# Patient Record
Sex: FEMALE | Race: OTHER | HISPANIC OR LATINO | URBAN - METROPOLITAN AREA
[De-identification: names, ages, dates, MRNs, and addresses within clinical notes are randomized per-mention and may not be internally consistent; named-entity substitution may affect disease eponyms.]

---

## 2019-01-30 ENCOUNTER — OUTPATIENT (OUTPATIENT)
Dept: OUTPATIENT SERVICES | Facility: HOSPITAL | Age: 69
LOS: 1 days | End: 2019-01-30
Payer: COMMERCIAL

## 2019-01-30 ENCOUNTER — APPOINTMENT (OUTPATIENT)
Dept: ULTRASOUND IMAGING | Facility: CLINIC | Age: 69
End: 2019-01-30
Payer: COMMERCIAL

## 2019-01-30 DIAGNOSIS — Z00.8 ENCOUNTER FOR OTHER GENERAL EXAMINATION: ICD-10-CM

## 2019-01-30 PROCEDURE — 76700 US EXAM ABDOM COMPLETE: CPT

## 2019-01-30 PROCEDURE — 76700 US EXAM ABDOM COMPLETE: CPT | Mod: 26

## 2021-02-24 ENCOUNTER — APPOINTMENT (OUTPATIENT)
Dept: PULMONOLOGY | Facility: CLINIC | Age: 71
End: 2021-02-24
Payer: COMMERCIAL

## 2021-02-24 VITALS — OXYGEN SATURATION: 98 % | SYSTOLIC BLOOD PRESSURE: 140 MMHG | HEART RATE: 78 BPM | DIASTOLIC BLOOD PRESSURE: 80 MMHG

## 2021-02-24 VITALS — HEIGHT: 65 IN | WEIGHT: 148 LBS | BODY MASS INDEX: 24.66 KG/M2

## 2021-02-24 VITALS — TEMPERATURE: 97.1 F

## 2021-02-24 VITALS — RESPIRATION RATE: 13 BRPM

## 2021-02-24 PROCEDURE — 99204 OFFICE O/P NEW MOD 45 MIN: CPT

## 2021-02-24 PROCEDURE — 99072 ADDL SUPL MATRL&STAF TM PHE: CPT

## 2021-02-24 RX ORDER — CETIRIZINE HYDROCHLORIDE 10 MG/1
CAPSULE, LIQUID FILLED ORAL
Refills: 0 | Status: ACTIVE | COMMUNITY

## 2021-02-24 NOTE — PHYSICAL EXAM
[No Acute Distress] : no acute distress [Normal Oropharynx] : normal oropharynx [Low Lying Soft Palate] : low lying soft palate [Enlarged Base of the Tongue] : enlarged base of the tongue [III] : Mallampati Class: III [Normal Appearance] : normal appearance [No Neck Mass] : no neck mass [Normal Rate/Rhythm] : normal rate/rhythm [Normal S1, S2] : normal s1, s2 [No Murmurs] : no murmurs [No Resp Distress] : no resp distress [Clear to Auscultation Bilaterally] : clear to auscultation bilaterally [No Abnormalities] : no abnormalities [Benign] : benign [Normal Gait] : normal gait [No Clubbing] : no clubbing [No Cyanosis] : no cyanosis [No Edema] : no edema [FROM] : FROM [Normal Color/ Pigmentation] : normal color/ pigmentation [No Focal Deficits] : no focal deficits [Oriented x3] : oriented x3 [Normal Affect] : normal affect

## 2021-02-24 NOTE — CONSULT LETTER
[Dear  ___] : Dear  [unfilled], [Consult Letter:] : I had the pleasure of evaluating your patient, [unfilled]. [Please see my note below.] : Please see my note below. [Consult Closing:] : Thank you very much for allowing me to participate in the care of this patient.  If you have any questions, please do not hesitate to contact me. [Sincerely,] : Sincerely, [FreeTextEntry3] : Michelle Cummings MD FCCP\par D-ABSM\par ABIM board certified in  Pulmonary diseases, Sleep medicine\par Internal medicine\par

## 2021-02-24 NOTE — HISTORY OF PRESENT ILLNESS
[TextBox_4] : The patient was diagnosed with obstructive sleep apnea about 8 years ago. She said CPAP since then. She tried an oral appliance which didn't work. Her current machine is over 5 years old. She's been complaining of sore throats. She is known to snore at times through the mask. Her use of CPAP has been intermittent because of the dryness and the sore throats. Her weight is similar to what was seen in 2013. She denies shortness of breath cough or wheeze. [Obstructive Sleep Apnea] : obstructive sleep apnea [CPAP:] : CPAP [TextBox_100] : 5/13 [TextBox_108] : 39 [TextBox_112] : 92 [TextBox_116] : 74 [TextBox_104] : 6/13 [TextBox_131] : 10

## 2021-02-24 NOTE — REASON FOR VISIT
[Consultation] : a consultation [Sleep Apnea] : sleep apnea [[employer] ] : provided by [employer]   [FreeTextEntry1] : [id number] [FreeTextEntry2] : Anne [TWNoteComboBox1] : [place]

## 2021-02-24 NOTE — ASSESSMENT
[FreeTextEntry1] : Patient with a history of severe obstructive sleep apnea. His current CPAP intolerance maybe due to poor humidification or inappropriate pressures. Repeat sleep study has been ordered. I will see her back after that. We will order AutoPap with heated hose at that point. If this does not solve the problem, she would be a good candidate for Inspire therapy.

## 2021-03-14 DIAGNOSIS — Z01.818 ENCOUNTER FOR OTHER PREPROCEDURAL EXAMINATION: ICD-10-CM

## 2021-03-15 ENCOUNTER — APPOINTMENT (OUTPATIENT)
Dept: DISASTER EMERGENCY | Facility: CLINIC | Age: 71
End: 2021-03-15

## 2021-03-16 LAB — SARS-COV-2 N GENE NPH QL NAA+PROBE: NOT DETECTED

## 2021-03-17 ENCOUNTER — OUTPATIENT (OUTPATIENT)
Dept: OUTPATIENT SERVICES | Facility: HOSPITAL | Age: 71
LOS: 1 days | End: 2021-03-17
Payer: COMMERCIAL

## 2021-03-17 DIAGNOSIS — G47.33 OBSTRUCTIVE SLEEP APNEA (ADULT) (PEDIATRIC): ICD-10-CM

## 2021-03-17 PROCEDURE — 95810 POLYSOM 6/> YRS 4/> PARAM: CPT | Mod: 26

## 2021-03-17 PROCEDURE — 95810 POLYSOM 6/> YRS 4/> PARAM: CPT

## 2021-03-29 ENCOUNTER — APPOINTMENT (OUTPATIENT)
Dept: PULMONOLOGY | Facility: CLINIC | Age: 71
End: 2021-03-29
Payer: COMMERCIAL

## 2021-03-29 VITALS
BODY MASS INDEX: 26.58 KG/M2 | WEIGHT: 150 LBS | DIASTOLIC BLOOD PRESSURE: 68 MMHG | SYSTOLIC BLOOD PRESSURE: 124 MMHG | HEIGHT: 63 IN | TEMPERATURE: 97.8 F | HEART RATE: 67 BPM | RESPIRATION RATE: 14 BRPM | OXYGEN SATURATION: 98 %

## 2021-03-29 PROCEDURE — 99072 ADDL SUPL MATRL&STAF TM PHE: CPT

## 2021-03-29 PROCEDURE — 99214 OFFICE O/P EST MOD 30 MIN: CPT

## 2021-03-29 RX ORDER — ERGOCALCIFEROL 1.25 MG/1
1.25 MG CAPSULE, LIQUID FILLED ORAL
Qty: 8 | Refills: 0 | Status: ACTIVE | COMMUNITY
Start: 2021-02-23

## 2021-03-29 RX ORDER — CEPHALEXIN 250 MG/1
250 CAPSULE ORAL
Qty: 40 | Refills: 0 | Status: DISCONTINUED | COMMUNITY
Start: 2020-11-30 | End: 2021-03-29

## 2021-03-29 RX ORDER — BLOOD SUGAR DIAGNOSTIC
STRIP MISCELLANEOUS
Qty: 250 | Refills: 0 | Status: ACTIVE | COMMUNITY
Start: 2020-10-24

## 2021-03-29 RX ORDER — ERYTHROMYCIN 5 MG/G
5 OINTMENT OPHTHALMIC
Qty: 4 | Refills: 0 | Status: DISCONTINUED | COMMUNITY
Start: 2020-10-12 | End: 2021-03-29

## 2021-03-29 RX ORDER — ATORVASTATIN CALCIUM 40 MG/1
40 TABLET, FILM COATED ORAL
Qty: 90 | Refills: 0 | Status: ACTIVE | COMMUNITY
Start: 2020-10-24

## 2021-03-29 RX ORDER — CIPROFLOXACIN HYDROCHLORIDE 500 MG/1
500 TABLET, FILM COATED ORAL
Qty: 1 | Refills: 0 | Status: DISCONTINUED | COMMUNITY
Start: 2020-12-31 | End: 2021-03-29

## 2021-03-29 RX ORDER — ICOSAPENT ETHYL 1000 MG/1
1 CAPSULE ORAL
Qty: 360 | Refills: 0 | Status: ACTIVE | COMMUNITY
Start: 2021-03-03

## 2021-03-29 RX ORDER — VALACYCLOVIR 500 MG/1
500 TABLET, FILM COATED ORAL
Qty: 60 | Refills: 0 | Status: ACTIVE | COMMUNITY
Start: 2021-01-30

## 2021-03-29 RX ORDER — TAMSULOSIN HYDROCHLORIDE 0.4 MG/1
0.4 CAPSULE ORAL
Qty: 30 | Refills: 0 | Status: ACTIVE | COMMUNITY
Start: 2021-01-14

## 2021-03-29 RX ORDER — AMITRIPTYLINE HYDROCHLORIDE 10 MG/1
10 TABLET, FILM COATED ORAL
Qty: 30 | Refills: 0 | Status: ACTIVE | COMMUNITY
Start: 2021-02-23

## 2021-03-29 RX ORDER — METFORMIN ER 500 MG 500 MG/1
500 TABLET ORAL
Qty: 270 | Refills: 0 | Status: ACTIVE | COMMUNITY
Start: 2020-10-24

## 2021-03-29 RX ORDER — ZOLPIDEM TARTRATE 10 MG/1
10 TABLET ORAL
Qty: 30 | Refills: 0 | Status: ACTIVE | COMMUNITY
Start: 2021-03-09

## 2021-03-29 RX ORDER — METOPROLOL SUCCINATE 50 MG/1
50 TABLET, EXTENDED RELEASE ORAL
Qty: 180 | Refills: 0 | Status: ACTIVE | COMMUNITY
Start: 2021-03-03

## 2021-03-29 RX ORDER — LANCETS 30 GAUGE
EACH MISCELLANEOUS
Qty: 200 | Refills: 0 | Status: ACTIVE | COMMUNITY
Start: 2020-05-19

## 2021-03-29 NOTE — HISTORY OF PRESENT ILLNESS
[Obstructive Sleep Apnea] : obstructive sleep apnea [Lab] : lab [TextBox_100] : 3/21 [TextBox_108] : 30 [TextBox_112] : 86 [TextBox_116] : 78 [TextBox_120] : AHI 51 in REM [TextBox_165] : I reviewed the patient's sleep study with the patient.\par The patient does not wish to have CPAP as she has not tolerated in the past. She wishes Inspire therapy.

## 2021-03-29 NOTE — ASSESSMENT
[FreeTextEntry1] : Patient with moderate to severe sleep apnea with CPAP intolerance. Her BMI is appropriate for Inspire therapy. Literature was given to the patient and referral was made. I will see her back here for programming after the implant is done.

## 2021-04-16 ENCOUNTER — APPOINTMENT (OUTPATIENT)
Dept: OTOLARYNGOLOGY | Facility: CLINIC | Age: 71
End: 2021-04-16
Payer: COMMERCIAL

## 2021-04-16 VITALS
HEIGHT: 63.5 IN | BODY MASS INDEX: 26.77 KG/M2 | SYSTOLIC BLOOD PRESSURE: 148 MMHG | WEIGHT: 153 LBS | HEART RATE: 75 BPM | DIASTOLIC BLOOD PRESSURE: 66 MMHG

## 2021-04-16 VITALS — BODY MASS INDEX: 26.77 KG/M2 | WEIGHT: 153 LBS | HEIGHT: 63.5 IN

## 2021-04-16 DIAGNOSIS — R73.03 PREDIABETES.: ICD-10-CM

## 2021-04-16 DIAGNOSIS — Z78.9 OTHER SPECIFIED HEALTH STATUS: ICD-10-CM

## 2021-04-16 PROCEDURE — 31575 DIAGNOSTIC LARYNGOSCOPY: CPT

## 2021-04-16 PROCEDURE — 99204 OFFICE O/P NEW MOD 45 MIN: CPT | Mod: 25

## 2021-04-16 PROCEDURE — 99072 ADDL SUPL MATRL&STAF TM PHE: CPT

## 2021-04-16 RX ORDER — NITROFURANTOIN (MONOHYDRATE/MACROCRYSTALS) 25; 75 MG/1; MG/1
100 CAPSULE ORAL
Qty: 30 | Refills: 0 | Status: DISCONTINUED | COMMUNITY
Start: 2021-01-14 | End: 2021-04-16

## 2021-04-16 NOTE — PROCEDURE
[Video Captured] : video captured and filed [Image(s) Captured] : image(s) captured and filed [Obstruction] : acute airway obstruction evaluation [Complicated Symptoms] : complicated symptoms requiring more thorough examination than provided by mirror [Topical Lidocaine] : topical lidocaine [Oxymetazoline HCl] : oxymetazoline HCl [Flexible Endoscope] : examined with the flexible endoscope [Serial Number: ___] : Serial Number: [unfilled] [Velopharynx ___ %] : the velopharynx was [unfilled]U% collapsed [Normal] : the false vocal folds were pink and regular, the ventricular sulcus was open, the true vocal folds were glistening white, tense and of equal length, mobility, and height [True Vocal Cords Paralysis] : no true vocal cord paralysis [True Vocal Cords Erythematous] : no true vocal cord edema [True Vocal Cords Parker's Nodules] : no true vocal cord nodules [Glottis Arytenoid Cartilages] : no arytenoid granulomas [Glottis Arytenoid Cartilages Erythema] : no arytenoid erythema [de-identified] : Patient was placed in the examination chair in a sitting position. The nose was decongested with oxymetazoline nasal solution. The airway was anesthetized with 4% Xylocaine.  The back of the throat was anesthetized with Cetacaine. Direct flexible/rigid video endoscopy was performed. Findings revealed:\par Patient is a deviated nasal septum to the left turbinate hypertrophy. Positive Gupta maneuver on inspiration with complete closure. Thick base of tongue larynx epiglottic vocal cords normal [FreeTextEntry3] : positive Gupta maneuver [de-identified] : thick

## 2021-04-16 NOTE — REASON FOR VISIT
[Initial Consultation] : an initial consultation for [FreeTextEntry2] : referred by Dr. Michelle Cummings, pulmonologist, for obstructive sleep apnea

## 2021-04-16 NOTE — CONSULT LETTER
[Dear  ___] : Dear  [unfilled], [Consult Letter:] : I had the pleasure of evaluating your patient, [unfilled]. [Please see my note below.] : Please see my note below. [Consult Closing:] : Thank you very much for allowing me to participate in the care of this patient.  If you have any questions, please do not hesitate to contact me. [Sincerely,] : Sincerely, [FreeTextEntry3] : Iggy Borges MD, ALCON, FACS\par  Department Otolaryngology\par Director of [facility]\par Professor of Otolaryngology, \par [employer]\par

## 2021-04-16 NOTE — HISTORY OF PRESENT ILLNESS
[Obstructive Sleep Apnea] : Obstructive Sleep Apnea [Snoring] : snoring [Witnessed Apneas] : witnessed sleep apnea [Frequent Nocturnal Awakening] : frequent nocturnal awakening [Daytime Somnolence] : daytime somnolence [Unintentional Sleep while Active] : unintentional sleep while active [Unintentional Sleep While Inactive] : unintentional sleep while inactive [Awakes Unrefreshed] : awakening unrefreshed [Awakening With Dry Mouth] : awakening with dry mouth [AHI: ___ per hour] : the apnea-hypopnea index was [unfilled] per hour [de-identified] : 70 year old female referred by Dr. Michelle Cummings, pulmonologist, for obstructive sleep apnea.  Patient had a sleep study conducted on 3/17/21 showing an AHI of 29.9 with central and mixed apneas less than 25% of the total apneas.\par Patient has a BMI of 26.7.  Patient has tried a CPAP machine without relief.  Patient complains of removing mask during sleep.  Patient has tried various types of masks without relief.  Patient states mask causes skin irritation, difficulty breathing, dry nose, dry mouth.  Patient sleeping partner complains about the noise of the CPAP machine, stating it keeps them awake at night.  Patient comorbidities include pre-diabetic, daytime fatigue.\par  [Awakes with Headache] : no headache upon awakening
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
## 2021-05-02 ENCOUNTER — EMERGENCY (EMERGENCY)
Facility: HOSPITAL | Age: 71
LOS: 1 days | Discharge: HOME | End: 2021-05-02
Attending: EMERGENCY MEDICINE
Payer: COMMERCIAL

## 2021-05-02 VITALS
OXYGEN SATURATION: 99 % | TEMPERATURE: 98 F | SYSTOLIC BLOOD PRESSURE: 133 MMHG | DIASTOLIC BLOOD PRESSURE: 63 MMHG | HEART RATE: 71 BPM | RESPIRATION RATE: 20 BRPM

## 2021-05-02 LAB
ALBUMIN SERPL ELPH-MCNC: 4.1 G/DL — SIGNIFICANT CHANGE UP (ref 3.3–5.2)
ALP SERPL-CCNC: 63 U/L — SIGNIFICANT CHANGE UP (ref 40–120)
ALT FLD-CCNC: 28 U/L — SIGNIFICANT CHANGE UP
ANION GAP SERPL CALC-SCNC: 17 MMOL/L — SIGNIFICANT CHANGE UP (ref 5–17)
APPEARANCE UR: CLEAR — SIGNIFICANT CHANGE UP
APTT BLD: 26 SEC — LOW (ref 27.5–35.5)
AST SERPL-CCNC: 26 U/L — SIGNIFICANT CHANGE UP
BASOPHILS # BLD AUTO: 0.05 K/UL — SIGNIFICANT CHANGE UP (ref 0–0.2)
BASOPHILS NFR BLD AUTO: 0.8 % — SIGNIFICANT CHANGE UP (ref 0–2)
BILIRUB SERPL-MCNC: 0.3 MG/DL — LOW (ref 0.4–2)
BILIRUB UR-MCNC: NEGATIVE — SIGNIFICANT CHANGE UP
BUN SERPL-MCNC: 16 MG/DL — SIGNIFICANT CHANGE UP (ref 8–20)
CALCIUM SERPL-MCNC: 9.5 MG/DL — SIGNIFICANT CHANGE UP (ref 8.6–10.2)
CHLORIDE SERPL-SCNC: 101 MMOL/L — SIGNIFICANT CHANGE UP (ref 98–107)
CK SERPL-CCNC: 62 U/L — SIGNIFICANT CHANGE UP (ref 25–170)
CO2 SERPL-SCNC: 19 MMOL/L — LOW (ref 22–29)
COLOR SPEC: YELLOW — SIGNIFICANT CHANGE UP
CREAT SERPL-MCNC: 1.03 MG/DL — SIGNIFICANT CHANGE UP (ref 0.5–1.3)
DIFF PNL FLD: NEGATIVE — SIGNIFICANT CHANGE UP
EOSINOPHIL # BLD AUTO: 0.07 K/UL — SIGNIFICANT CHANGE UP (ref 0–0.5)
EOSINOPHIL NFR BLD AUTO: 1.1 % — SIGNIFICANT CHANGE UP (ref 0–6)
EPI CELLS # UR: SIGNIFICANT CHANGE UP
GLUCOSE SERPL-MCNC: 188 MG/DL — HIGH (ref 70–99)
GLUCOSE UR QL: 1000 MG/DL
HCT VFR BLD CALC: 39.8 % — SIGNIFICANT CHANGE UP (ref 34.5–45)
HGB BLD-MCNC: 13.6 G/DL — SIGNIFICANT CHANGE UP (ref 11.5–15.5)
IMM GRANULOCYTES NFR BLD AUTO: 1.1 % — SIGNIFICANT CHANGE UP (ref 0–1.5)
INR BLD: 1.03 RATIO — SIGNIFICANT CHANGE UP (ref 0.88–1.16)
KETONES UR-MCNC: ABNORMAL
LEUKOCYTE ESTERASE UR-ACNC: NEGATIVE — SIGNIFICANT CHANGE UP
LYMPHOCYTES # BLD AUTO: 1.54 K/UL — SIGNIFICANT CHANGE UP (ref 1–3.3)
LYMPHOCYTES # BLD AUTO: 24.2 % — SIGNIFICANT CHANGE UP (ref 13–44)
MCHC RBC-ENTMCNC: 31.9 PG — SIGNIFICANT CHANGE UP (ref 27–34)
MCHC RBC-ENTMCNC: 34.2 GM/DL — SIGNIFICANT CHANGE UP (ref 32–36)
MCV RBC AUTO: 93.2 FL — SIGNIFICANT CHANGE UP (ref 80–100)
MONOCYTES # BLD AUTO: 0.51 K/UL — SIGNIFICANT CHANGE UP (ref 0–0.9)
MONOCYTES NFR BLD AUTO: 8 % — SIGNIFICANT CHANGE UP (ref 2–14)
NEUTROPHILS # BLD AUTO: 4.12 K/UL — SIGNIFICANT CHANGE UP (ref 1.8–7.4)
NEUTROPHILS NFR BLD AUTO: 64.8 % — SIGNIFICANT CHANGE UP (ref 43–77)
NITRITE UR-MCNC: NEGATIVE — SIGNIFICANT CHANGE UP
PH UR: 8 — SIGNIFICANT CHANGE UP (ref 5–8)
PLATELET # BLD AUTO: 154 K/UL — SIGNIFICANT CHANGE UP (ref 150–400)
POTASSIUM SERPL-MCNC: 4 MMOL/L — SIGNIFICANT CHANGE UP (ref 3.5–5.3)
POTASSIUM SERPL-SCNC: 4 MMOL/L — SIGNIFICANT CHANGE UP (ref 3.5–5.3)
PROT SERPL-MCNC: 7.6 G/DL — SIGNIFICANT CHANGE UP (ref 6.6–8.7)
PROT UR-MCNC: 15 MG/DL
PROTHROM AB SERPL-ACNC: 11.9 SEC — SIGNIFICANT CHANGE UP (ref 10.6–13.6)
RBC # BLD: 4.27 M/UL — SIGNIFICANT CHANGE UP (ref 3.8–5.2)
RBC # FLD: 12.5 % — SIGNIFICANT CHANGE UP (ref 10.3–14.5)
RBC CASTS # UR COMP ASSIST: NEGATIVE /HPF — SIGNIFICANT CHANGE UP (ref 0–4)
SODIUM SERPL-SCNC: 137 MMOL/L — SIGNIFICANT CHANGE UP (ref 135–145)
SP GR SPEC: 1.01 — SIGNIFICANT CHANGE UP (ref 1.01–1.02)
TROPONIN T SERPL-MCNC: <0.01 NG/ML — SIGNIFICANT CHANGE UP (ref 0–0.06)
UROBILINOGEN FLD QL: NEGATIVE MG/DL — SIGNIFICANT CHANGE UP
WBC # BLD: 6.36 K/UL — SIGNIFICANT CHANGE UP (ref 3.8–10.5)
WBC # FLD AUTO: 6.36 K/UL — SIGNIFICANT CHANGE UP (ref 3.8–10.5)
WBC UR QL: SIGNIFICANT CHANGE UP

## 2021-05-02 PROCEDURE — 71045 X-RAY EXAM CHEST 1 VIEW: CPT | Mod: 26

## 2021-05-02 PROCEDURE — 99285 EMERGENCY DEPT VISIT HI MDM: CPT

## 2021-05-02 PROCEDURE — 93010 ELECTROCARDIOGRAM REPORT: CPT

## 2021-05-02 RX ORDER — METOCLOPRAMIDE HCL 10 MG
10 TABLET ORAL ONCE
Refills: 0 | Status: COMPLETED | OUTPATIENT
Start: 2021-05-02 | End: 2021-05-02

## 2021-05-02 RX ORDER — MECLIZINE HCL 12.5 MG
25 TABLET ORAL ONCE
Refills: 0 | Status: COMPLETED | OUTPATIENT
Start: 2021-05-02 | End: 2021-05-02

## 2021-05-02 RX ORDER — SODIUM CHLORIDE 9 MG/ML
3 INJECTION INTRAMUSCULAR; INTRAVENOUS; SUBCUTANEOUS ONCE
Refills: 0 | Status: COMPLETED | OUTPATIENT
Start: 2021-05-02 | End: 2021-05-02

## 2021-05-02 RX ADMIN — Medication 10 MILLIGRAM(S): at 22:47

## 2021-05-02 RX ADMIN — SODIUM CHLORIDE 3 MILLILITER(S): 9 INJECTION INTRAMUSCULAR; INTRAVENOUS; SUBCUTANEOUS at 21:53

## 2021-05-02 RX ADMIN — Medication 104 MILLIGRAM(S): at 22:16

## 2021-05-02 RX ADMIN — Medication 25 MILLIGRAM(S): at 22:16

## 2021-05-02 NOTE — ED ADULT TRIAGE NOTE - CHIEF COMPLAINT QUOTE
pt states she passed out and is dizzy has been dizzy on and off for the past few days. pt now vomiting.

## 2021-05-02 NOTE — ED PROVIDER NOTE - PROGRESS NOTE DETAILS
Labs, UA and CT results as noted.  Pt feels well for d/.c at this time and will refer to neuro as outpt.  Rx Zofran and Meclizine.  Pt understands need to f/u PMD later today

## 2021-05-02 NOTE — ED PROVIDER NOTE - CARE PROVIDER_API CALL
Adarsh Villagran; PhD)  Neurology; Vascular Neurology  [address] [place] [address]  Phone: [phone]  Fax: [phone]  Follow Up Time:

## 2021-05-02 NOTE — ED ADULT NURSE NOTE - OBJECTIVE STATEMENT
Chief complaint of dizziness x3 days increasing in intensity today. Pt states, "I feel like there is something electrical in my head and it makes a feeling". +Upper abdominal pain and nausea. Pt placed on CM and . Pt alert and oriented x3, lethargic, respirations even and unlabored, skin warm and dry, color appropriate for ethnicity, speech clear, moving extremities. Updated pt on plan of care. Will continue to monitor.

## 2021-05-02 NOTE — ED PROVIDER NOTE - OBJECTIVE STATEMENT
69 y/o female with PMHx of Sleep apnea presents to ED s/p syncope. Patient reports 6 hours ago she was at home praying with her head down on her table, and when she lifted her head off the table, she started having dizziness. Patient reports weakness, nausea, "weakness in her stomach and body" and vomiting. Patient was recently on a 7 day course of an antibiotic for a UTI, then put on another 10 day course. Patient also reports a feeling in her head that started 3 days ago, but worsened tonight, felt like a lit candle touched her head.     Denies double vision  : Katerin

## 2021-05-03 VITALS
DIASTOLIC BLOOD PRESSURE: 69 MMHG | OXYGEN SATURATION: 96 % | SYSTOLIC BLOOD PRESSURE: 129 MMHG | RESPIRATION RATE: 18 BRPM | HEART RATE: 78 BPM | TEMPERATURE: 98 F

## 2021-05-03 PROCEDURE — 85730 THROMBOPLASTIN TIME PARTIAL: CPT

## 2021-05-03 PROCEDURE — 81001 URINALYSIS AUTO W/SCOPE: CPT

## 2021-05-03 PROCEDURE — 82962 GLUCOSE BLOOD TEST: CPT

## 2021-05-03 PROCEDURE — 70450 CT HEAD/BRAIN W/O DYE: CPT

## 2021-05-03 PROCEDURE — 71045 X-RAY EXAM CHEST 1 VIEW: CPT

## 2021-05-03 PROCEDURE — 93005 ELECTROCARDIOGRAM TRACING: CPT

## 2021-05-03 PROCEDURE — 85610 PROTHROMBIN TIME: CPT

## 2021-05-03 PROCEDURE — 87086 URINE CULTURE/COLONY COUNT: CPT

## 2021-05-03 PROCEDURE — 99284 EMERGENCY DEPT VISIT MOD MDM: CPT | Mod: 25

## 2021-05-03 PROCEDURE — 82550 ASSAY OF CK (CPK): CPT

## 2021-05-03 PROCEDURE — 70450 CT HEAD/BRAIN W/O DYE: CPT | Mod: 26,MA

## 2021-05-03 PROCEDURE — 36415 COLL VENOUS BLD VENIPUNCTURE: CPT

## 2021-05-03 PROCEDURE — 96365 THER/PROPH/DIAG IV INF INIT: CPT

## 2021-05-03 PROCEDURE — 84484 ASSAY OF TROPONIN QUANT: CPT

## 2021-05-03 PROCEDURE — 85025 COMPLETE CBC W/AUTO DIFF WBC: CPT

## 2021-05-03 PROCEDURE — 80053 COMPREHEN METABOLIC PANEL: CPT

## 2021-05-03 RX ORDER — MECLIZINE HCL 12.5 MG
1 TABLET ORAL
Qty: 15 | Refills: 0
Start: 2021-05-03 | End: 2021-05-07

## 2021-05-03 RX ORDER — MECLIZINE HCL 12.5 MG
25 TABLET ORAL ONCE
Refills: 0 | Status: COMPLETED | OUTPATIENT
Start: 2021-05-03 | End: 2021-05-03

## 2021-05-03 RX ORDER — ONDANSETRON 8 MG/1
1 TABLET, FILM COATED ORAL
Qty: 20 | Refills: 0
Start: 2021-05-03 | End: 2021-05-07

## 2021-05-03 RX ORDER — ONDANSETRON 8 MG/1
4 TABLET, FILM COATED ORAL ONCE
Refills: 0 | Status: COMPLETED | OUTPATIENT
Start: 2021-05-03 | End: 2021-05-03

## 2021-05-03 RX ADMIN — Medication 25 MILLIGRAM(S): at 01:02

## 2021-05-03 RX ADMIN — ONDANSETRON 4 MILLIGRAM(S): 8 TABLET, FILM COATED ORAL at 01:02

## 2021-05-04 LAB
CULTURE RESULTS: SIGNIFICANT CHANGE UP
SPECIMEN SOURCE: SIGNIFICANT CHANGE UP

## 2021-05-10 ENCOUNTER — OUTPATIENT (OUTPATIENT)
Dept: OUTPATIENT SERVICES | Facility: HOSPITAL | Age: 71
LOS: 1 days | End: 2021-05-10

## 2021-05-10 VITALS
DIASTOLIC BLOOD PRESSURE: 70 MMHG | OXYGEN SATURATION: 98 % | SYSTOLIC BLOOD PRESSURE: 120 MMHG | HEIGHT: 63 IN | RESPIRATION RATE: 14 BRPM | WEIGHT: 147.93 LBS | TEMPERATURE: 97 F | HEART RATE: 74 BPM

## 2021-05-10 DIAGNOSIS — Z98.890 OTHER SPECIFIED POSTPROCEDURAL STATES: Chronic | ICD-10-CM

## 2021-05-10 DIAGNOSIS — G47.33 OBSTRUCTIVE SLEEP APNEA (ADULT) (PEDIATRIC): ICD-10-CM

## 2021-05-10 DIAGNOSIS — Z98.82 BREAST IMPLANT STATUS: Chronic | ICD-10-CM
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
## 2021-05-10 DIAGNOSIS — R42 DIZZINESS AND GIDDINESS: ICD-10-CM

## 2021-05-10 DIAGNOSIS — Z98.49 CATARACT EXTRACTION STATUS, UNSPECIFIED EYE: Chronic | ICD-10-CM

## 2021-05-10 LAB
A1C WITH ESTIMATED AVERAGE GLUCOSE RESULT: 6.1 % — HIGH (ref 4–5.6)
ANION GAP SERPL CALC-SCNC: 13 MMOL/L — SIGNIFICANT CHANGE UP (ref 7–14)
BUN SERPL-MCNC: 8 MG/DL — SIGNIFICANT CHANGE UP (ref 7–23)
CALCIUM SERPL-MCNC: 8.8 MG/DL — SIGNIFICANT CHANGE UP (ref 8.4–10.5)
CHLORIDE SERPL-SCNC: 105 MMOL/L — SIGNIFICANT CHANGE UP (ref 98–107)
CO2 SERPL-SCNC: 24 MMOL/L — SIGNIFICANT CHANGE UP (ref 22–31)
CREAT SERPL-MCNC: 0.55 MG/DL — SIGNIFICANT CHANGE UP (ref 0.5–1.3)
ESTIMATED AVERAGE GLUCOSE: 128 MG/DL — HIGH (ref 68–114)
GLUCOSE SERPL-MCNC: 100 MG/DL — HIGH (ref 70–99)
MAGNESIUM SERPL-MCNC: 2 MG/DL — SIGNIFICANT CHANGE UP (ref 1.6–2.6)
PHOSPHATE SERPL-MCNC: 1.9 MG/DL — LOW (ref 2.5–4.5)
POTASSIUM SERPL-MCNC: 4.1 MMOL/L — SIGNIFICANT CHANGE UP (ref 3.5–5.3)
POTASSIUM SERPL-SCNC: 4.1 MMOL/L — SIGNIFICANT CHANGE UP (ref 3.5–5.3)
SODIUM SERPL-SCNC: 142 MMOL/L — SIGNIFICANT CHANGE UP (ref 135–145)

## 2021-05-10 NOTE — H&P PST ADULT - ENMT COMMENTS
Pt went to ER c/o dizziness and vomiting 5/1/2021 pt was treated in emergency room and was release. issue resolve

## 2021-05-10 NOTE — H&P PST ADULT - CARDIOVASCULAR SYMPTOMS
chest pain/palpitations/dyspnea on exertion/orthopnea/paroxysmal nocturnal dyspnea/peripheral edema/claudication

## 2021-05-10 NOTE — H&P PST ADULT - NSICDXFAMILYHX_GEN_ALL_CORE_FT
FAMILY HISTORY:  Father  Still living? No  Family history of cirrhosis of liver, Age at diagnosis: Age Unknown  Family history of diabetes mellitus (DM), Age at diagnosis: Age Unknown    Mother  Still living? Unknown  Family history of diabetes mellitus (DM), Age at diagnosis: Age Unknown    Sibling  Still living? Unknown  Family history of diabetes mellitus (DM), Age at diagnosis: Age Unknown

## 2021-05-10 NOTE — H&P PST ADULT - HISTORY OF PRESENT ILLNESS
69 yo female with hx of JERE on CPAP c/o dry mouth, difficulty breathing with mask, skin irritation and nocturnal arousal leading  her to un compliant with CPAP usage.  Pt was referred to surgeon and no present in [facility] for preop evaluation for  laryngoscopy flexible diagnostic
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
## 2021-05-10 NOTE — H&P PST ADULT - NEGATIVE MUSCULOSKELETAL SYMPTOMS
no arthralgia/no arthritis/no joint swelling/no muscle cramps/no muscle weakness/no stiffness/no leg pain L

## 2021-05-10 NOTE — H&P PST ADULT - NSICDXPROBLEM_GEN_ALL_CORE_FT
PROBLEM DIAGNOSES  Problem: JERE on CPAP  Assessment and Plan: Scheduled for diagnostic larygoscopy flexible diagnostic  Preop instructions provided and patient verbalizes understanding.  Labs done and results pending.  Hibiclens and Famotidine provided with instructions. op and Hibiclens instructions given and explained.  Avoid NSAIDs and OTC supplements.   Patient verbalized understanding  medical consult requested   martinez notify OR Booking    Problem: Episode of dizziness  Assessment and Plan: Pt with recenet ER visit for [place], vomiting and dizziness- medical clearance requested      R/O PROBLEM DIAGNOSES  Problem: R/O HTN (hypertension)  Assessment and Plan: pt instructed to take metoprolol am surgery    Problem: R/O Diabetes mellitus  Assessment and Plan: Pt instructed to hold meformin am of surgery, no farxiga am of surgrey.  will notify OR booking     PROBLEM DIAGNOSES  Problem: JERE on CPAP  Assessment and Plan: Scheduled for diagnostic larygoscopy flexible diagnostic  Preop instructions provided and patient verbalizes understanding.  Labs done and results pending.  Hibiclens and Famotidine provided with instructions. op and Hibiclens instructions given and explained.  Avoid NSAIDs and OTC supplements.   Patient verbalized understanding  medical consult requested   martinez notify OR Booking    Problem: Episode of dizziness  Assessment and Plan: Pt with recenet ER visit for [place], vomiting and dizziness- medical clearance requested  ctscan of head in chart      R/O PROBLEM DIAGNOSES  Problem: R/O HTN (hypertension)  Assessment and Plan: pt instructed to take metoprolol am surgery    Problem: R/O Diabetes mellitus  Assessment and Plan: Pt instructed to hold meformin am of surgery, no farxiga am of surgrey.  will notify OR booking

## 2021-05-10 NOTE — H&P PST ADULT - GASTROINTESTINAL COMMENTS
Pt c/o nausea vomiting dizziness went to [facility] pt received ct-scan of head, cxr and lab work pt was treated and release Pt c/o nausea vomiting dizziness went to [facility]  Er pt received ct-scan of head, cxr and lab work pt was treated and release

## 2021-05-10 NOTE — H&P PST ADULT - NSICDXPASTMEDICALHX_GEN_ALL_CORE_FT
PAST MEDICAL HISTORY:  Acid reflux     Diabetes     Dyslipidemia     H/O herpes simplex infection     Insomnia     Overactive bladder

## 2021-05-15 ENCOUNTER — APPOINTMENT (OUTPATIENT)
Dept: DISASTER EMERGENCY | Facility: CLINIC | Age: 71
End: 2021-05-15

## 2021-05-16 LAB — SARS-COV-2 N GENE NPH QL NAA+PROBE: NOT DETECTED

## 2021-05-17 ENCOUNTER — TRANSCRIPTION ENCOUNTER (OUTPATIENT)
Age: 71
End: 2021-05-17

## 2021-05-17 NOTE — ASU PATIENT PROFILE, ADULT - FALLEN IN THE PAST
pls call, will need to stop aspirin and plavix 5 days prior to colonoscopy and may resume both one day after exam.   no

## 2021-05-17 NOTE — ASU PATIENT PROFILE, ADULT - PMH
Acid reflux    Diabetes    Dyslipidemia    H/O herpes simplex infection    Insomnia    Overactive bladder
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
## 2021-05-18 ENCOUNTER — OUTPATIENT (OUTPATIENT)
Dept: OUTPATIENT SERVICES | Facility: HOSPITAL | Age: 71
LOS: 1 days | Discharge: HOME | End: 2021-05-18
Payer: COMMERCIAL

## 2021-05-18 ENCOUNTER — APPOINTMENT (OUTPATIENT)
Dept: OTOLARYNGOLOGY | Facility: HOSPITAL | Age: 71
End: 2021-05-18

## 2021-05-18 VITALS
WEIGHT: 147.93 LBS | HEART RATE: 56 BPM | OXYGEN SATURATION: 99 % | SYSTOLIC BLOOD PRESSURE: 141 MMHG | DIASTOLIC BLOOD PRESSURE: 68 MMHG | HEIGHT: 63 IN | RESPIRATION RATE: 18 BRPM | TEMPERATURE: 98 F

## 2021-05-18 VITALS
RESPIRATION RATE: 17 BRPM | OXYGEN SATURATION: 100 % | DIASTOLIC BLOOD PRESSURE: 67 MMHG | SYSTOLIC BLOOD PRESSURE: 134 MMHG | HEART RATE: 56 BPM
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
## 2021-05-18 DIAGNOSIS — Z98.82 BREAST IMPLANT STATUS: Chronic | ICD-10-CM

## 2021-05-18 DIAGNOSIS — Z98.890 OTHER SPECIFIED POSTPROCEDURAL STATES: Chronic | ICD-10-CM

## 2021-05-18 DIAGNOSIS — G47.33 OBSTRUCTIVE SLEEP APNEA (ADULT) (PEDIATRIC): ICD-10-CM

## 2021-05-18 DIAGNOSIS — Z98.49 CATARACT EXTRACTION STATUS, UNSPECIFIED EYE: Chronic | ICD-10-CM

## 2021-05-18 LAB — GLUCOSE BLDC GLUCOMTR-MCNC: 99 MG/DL — SIGNIFICANT CHANGE UP (ref 70–99)

## 2021-05-18 PROCEDURE — 31575 DIAGNOSTIC LARYNGOSCOPY: CPT | Mod: GC

## 2021-05-18 RX ORDER — ACETAMINOPHEN 500 MG
650 TABLET ORAL ONCE
Refills: 0 | Status: DISCONTINUED | OUTPATIENT
Start: 2021-05-18 | End: 2021-06-02

## 2021-05-18 RX ORDER — SODIUM CHLORIDE 9 MG/ML
500 INJECTION, SOLUTION INTRAVENOUS
Refills: 0 | Status: DISCONTINUED | OUTPATIENT
Start: 2021-05-18 | End: 2021-06-02

## 2021-05-18 NOTE — ASU DISCHARGE PLAN (ADULT/PEDIATRIC) - ASU DC SPECIAL INSTRUCTIONSFT
Follow up 1 - 2 weeks after discharge. Call office for appointment.  Office: [phone].   Continue home medications and home CPAP

## 2021-05-18 NOTE — ASU DISCHARGE PLAN (ADULT/PEDIATRIC) - FOLLOW UP APPOINTMENTS
may also call Recovery Room (PACU) 24/7 @ [phone]/[facility]

## 2021-05-18 NOTE — ASU DISCHARGE PLAN (ADULT/PEDIATRIC) - CARE PROVIDER_API CALL
Iggy Borges; ALCON)  Otolaryngology  [address] [place] [id number]  Phone: [phone]  Fax: [phone]  Follow Up Time:

## 2021-06-17 PROBLEM — N32.81 OVERACTIVE BLADDER: Chronic | Status: ACTIVE | Noted: 2021-05-10

## 2021-06-17 PROBLEM — Z86.19 PERSONAL HISTORY OF OTHER INFECTIOUS AND PARASITIC DISEASES: Chronic | Status: ACTIVE | Noted: 2021-05-10

## 2021-06-17 PROBLEM — G47.00 INSOMNIA, UNSPECIFIED: Chronic | Status: ACTIVE | Noted: 2021-05-10

## 2021-06-17 PROBLEM — E78.5 HYPERLIPIDEMIA, UNSPECIFIED: Chronic | Status: ACTIVE | Noted: 2021-05-10

## 2021-06-17 PROBLEM — K21.9 GASTRO-ESOPHAGEAL REFLUX DISEASE WITHOUT ESOPHAGITIS: Chronic | Status: ACTIVE | Noted: 2021-05-10

## 2021-06-17 PROBLEM — E11.9 TYPE 2 DIABETES MELLITUS WITHOUT COMPLICATIONS: Chronic | Status: ACTIVE | Noted: 2021-05-10

## 2021-07-20 ENCOUNTER — OUTPATIENT (OUTPATIENT)
Dept: OUTPATIENT SERVICES | Facility: HOSPITAL | Age: 71
LOS: 1 days | End: 2021-07-20

## 2021-07-20 VITALS
TEMPERATURE: 98 F | RESPIRATION RATE: 16 BRPM | HEART RATE: 71 BPM | DIASTOLIC BLOOD PRESSURE: 70 MMHG | OXYGEN SATURATION: 98 % | SYSTOLIC BLOOD PRESSURE: 100 MMHG | WEIGHT: 149.91 LBS | HEIGHT: 64 IN

## 2021-07-20 DIAGNOSIS — G47.33 OBSTRUCTIVE SLEEP APNEA (ADULT) (PEDIATRIC): ICD-10-CM

## 2021-07-20 DIAGNOSIS — E11.9 TYPE 2 DIABETES MELLITUS WITHOUT COMPLICATIONS: ICD-10-CM

## 2021-07-20 DIAGNOSIS — Z98.890 OTHER SPECIFIED POSTPROCEDURAL STATES: Chronic | ICD-10-CM

## 2021-07-20 DIAGNOSIS — Z98.82 BREAST IMPLANT STATUS: Chronic | ICD-10-CM

## 2021-07-20 DIAGNOSIS — I10 ESSENTIAL (PRIMARY) HYPERTENSION: ICD-10-CM

## 2021-07-20 DIAGNOSIS — Z98.49 CATARACT EXTRACTION STATUS, UNSPECIFIED EYE: Chronic | ICD-10-CM
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
## 2021-07-20 LAB
A1C WITH ESTIMATED AVERAGE GLUCOSE RESULT: 5.8 % — HIGH (ref 4–5.6)
ANION GAP SERPL CALC-SCNC: 11 MMOL/L — SIGNIFICANT CHANGE UP (ref 7–14)
BUN SERPL-MCNC: 11 MG/DL — SIGNIFICANT CHANGE UP (ref 7–23)
CALCIUM SERPL-MCNC: 9.9 MG/DL — SIGNIFICANT CHANGE UP (ref 8.4–10.5)
CHLORIDE SERPL-SCNC: 103 MMOL/L — SIGNIFICANT CHANGE UP (ref 98–107)
CO2 SERPL-SCNC: 26 MMOL/L — SIGNIFICANT CHANGE UP (ref 22–31)
CREAT SERPL-MCNC: 0.61 MG/DL — SIGNIFICANT CHANGE UP (ref 0.5–1.3)
ESTIMATED AVERAGE GLUCOSE: 120 — SIGNIFICANT CHANGE UP
GLUCOSE SERPL-MCNC: 134 MG/DL — HIGH (ref 70–99)
HCT VFR BLD CALC: 44.3 % — SIGNIFICANT CHANGE UP (ref 34.5–45)
HGB BLD-MCNC: 14.1 G/DL — SIGNIFICANT CHANGE UP (ref 11.5–15.5)
MCHC RBC-ENTMCNC: 30.9 PG — SIGNIFICANT CHANGE UP (ref 27–34)
MCHC RBC-ENTMCNC: 31.8 GM/DL — LOW (ref 32–36)
MCV RBC AUTO: 96.9 FL — SIGNIFICANT CHANGE UP (ref 80–100)
NRBC # BLD: 0 /100 WBCS — SIGNIFICANT CHANGE UP
NRBC # FLD: 0 K/UL — SIGNIFICANT CHANGE UP
PLATELET # BLD AUTO: 131 K/UL — LOW (ref 150–400)
POTASSIUM SERPL-MCNC: 4.2 MMOL/L — SIGNIFICANT CHANGE UP (ref 3.5–5.3)
POTASSIUM SERPL-SCNC: 4.2 MMOL/L — SIGNIFICANT CHANGE UP (ref 3.5–5.3)
RBC # BLD: 4.57 M/UL — SIGNIFICANT CHANGE UP (ref 3.8–5.2)
RBC # FLD: 12.6 % — SIGNIFICANT CHANGE UP (ref 10.3–14.5)
SODIUM SERPL-SCNC: 140 MMOL/L — SIGNIFICANT CHANGE UP (ref 135–145)
WBC # BLD: 5.05 K/UL — SIGNIFICANT CHANGE UP (ref 3.8–10.5)
WBC # FLD AUTO: 5.05 K/UL — SIGNIFICANT CHANGE UP (ref 3.8–10.5)

## 2021-07-20 RX ORDER — ASPIRIN/CALCIUM CARB/MAGNESIUM 324 MG
0 TABLET ORAL
Qty: 0 | Refills: 0 | DISCHARGE

## 2021-07-20 RX ORDER — DAPAGLIFLOZIN 10 MG/1
1 TABLET, FILM COATED ORAL
Qty: 0 | Refills: 0 | DISCHARGE

## 2021-07-20 RX ORDER — ICOSAPENT ETHYL 500 MG/1
2 CAPSULE, LIQUID FILLED ORAL
Qty: 0 | Refills: 0 | DISCHARGE

## 2021-07-20 RX ORDER — MULTIVIT-MIN/FERROUS GLUCONATE 9 MG/15 ML
1 LIQUID (ML) ORAL
Qty: 0 | Refills: 0 | DISCHARGE

## 2021-07-20 RX ORDER — SODIUM CHLORIDE 9 MG/ML
1000 INJECTION, SOLUTION INTRAVENOUS
Refills: 0 | Status: DISCONTINUED | OUTPATIENT
Start: 2021-07-29 | End: 2021-08-12

## 2021-07-20 RX ORDER — OMEPRAZOLE 10 MG/1
1 CAPSULE, DELAYED RELEASE ORAL
Qty: 0 | Refills: 0 | DISCHARGE

## 2021-07-20 RX ORDER — ERGOCALCIFEROL 1.25 MG/1
0 CAPSULE ORAL
Qty: 0 | Refills: 0 | DISCHARGE

## 2021-07-20 NOTE — H&P PST ADULT - HISTORY OF PRESENT ILLNESS
71 yo female  with history of sleep apnea for the past 7 years.  Per patient only able to tolerate CPAP machine for several hours.  Patient reports dry mouth and hoarseness.  Patient now presents to PST for preop evaluation for cranial nerve stimulation generator and lead placement, insertion of chest wall respiratory sensor electrode or electrode array, including connection to pulse generator.

## 2021-07-20 NOTE — H&P PST ADULT - NSICDXPROBLEM_GEN_ALL_CORE_FT
PROBLEM DIAGNOSES  Problem: Obstructive sleep apnea (adult) (pediatric)  Assessment and Plan: Patient scheduled for cranial nerve stimulation generator and lead placement, insertion of chest wall respiratory sensor electrode or electrode array, including connection to pulse generator on 7/29/2021  Written & verbal preop instructions, gi prophylaxis & surgical soap given  Pt verbalized good understanding.  Teach back done on surgical soap instructions.   Patient is fully vaccinated, does not require preop COVID screen  Patient had recent Medical evaluation with PCP, requesting copy of report    Problem: Hypertension  Assessment and Plan: Patient instructed to take Metoprolol on AM of surgery with small sip of water   Patient had Cardiology evaluation today 7/20/2021, requesting copy of report, Echo and Stress test    Problem: Type 2 diabetes mellitus  Assessment and Plan: Patient instructed last dose of Metformin on 7/28/2021, HgbA1C pending, Accucheck ordered on DOS, OR booking notified

## 2021-07-20 NOTE — H&P PST ADULT - NEGATIVE NEUROLOGICAL SYMPTOMS
no weakness/no paresthesias/no generalized seizures/no focal seizures/no syncope/no difficulty walking/no headache

## 2021-07-20 NOTE — H&P PST ADULT - NS MD HP PULSE CAROTID
[Negative] : Skin [Immunizations are up to date] : Immunizations are up to date right normal/left normal

## 2021-07-20 NOTE — H&P PST ADULT - DOCUMENT STATUS
Problem: Self Care Deficits Care Plan (Adult)  Goal: *Acute Goals and Plan of Care (Insert Text)  Occupational Therapy Goals  Initiated 8/21/2018  1. Patient will perform ADLs standing 5 mins without fatigue or LOB with supervision/set-up within 7 day(s). 2.  Patient will perform lower body ADLs with supervision/set-up within 7 day(s). 3.  Patient will perform upper body dressing with supervision/set-up within 7 day(s). 4.  Patient will perform toilet transfers with supervision/set-up within 7 day(s). 5.  Patient will perform all aspects of toileting with supervision/set-up within 7 day(s). 6.  Patient will participate in cardiac/sternal upper extremity therapeutic exercise/activities to increase independence with ADLs with supervision/set-up for 5 minutes within 7 day(s). Occupational Therapy TREATMENT  Patient: Violetta Monae (49 y.o. female)  Date: 8/27/2018  Diagnosis: cc  CAD  Status post cardiac catheterization  CAD (coronary artery disease)  CAD (coronary artery disease) S/P CABG x 3  Procedure(s) (LRB):  CORONARY ARTERY BYPASS GRAFTING X 3 WITH LIMA, CARMEN, RESVGH, ECC, DAVIS AND EPIAORTIC ULTRASOUND BY DR. Janes Wang (N/A) 7 Days Post-Op  Precautions:    Chart, occupational therapy assessment, plan of care, and goals were reviewed. ASSESSMENT:  Patient received sitting in chair, alert, agreeable to therapy, and recalled 3/3 precautions. Reported recently performing 6/6 cardiac exercises with physical therapy. Performed the following with CGA: sit-stand, ambulated to bathroom, grooming standing at sink, reaching bilaterally for overhead objects in cabinet, and reaching for objects in low drawers. Patient able to tailor sit for LB dressing to avoid deep bending, doffed/ donned socks with SPV. Patient is mainly limited by BATRES and was unable to finish reaching activity in cabinets/ drawers, standing tolerance limited to ~7 minutes. Patient with BATRES, SPO2 remained >93% on 4 L O2 NC.   Patient remains below functional baseline. Recommend d/c to inpatient rehab to address activity/ standing tolerance, cardiopulmonary endurance, and dynamic standing balance required for ADLs/ mobility. Progression toward goals:  []       Improving appropriately and progressing toward goals  [x]       Improving slowly and progressing toward goals  []       Not making progress toward goals and plan of care will be adjusted     PLAN:  Patient continues to benefit from skilled intervention to address the above impairments. Continue treatment per established plan of care. Discharge Recommendations:  Inpatient Rehab  Further Equipment Recommendations for Discharge:  TBD     SUBJECTIVE:   Patient stated I feel winded.     The patient stated 3/3 sternal precautions. Reviewed all 3 with patient. OBJECTIVE DATA SUMMARY:   Cognitive/Behavioral Status:  Neurologic State: Alert  Orientation Level: Oriented X4  Cognition: Appropriate decision making; Appropriate for age attention/concentration; Appropriate safety awareness; Follows commands  Perception: Appears intact  Perseveration: No perseveration noted  Safety/Judgement: Awareness of environment; Insight into deficits;Good awareness of safety precautions    Functional Mobility and Transfers for ADLs:    Transfers:  Sit to Stand: Contact guard assistance         Balance:  Sitting: Intact  Standing: Impaired  Standing - Static: Fair  Standing - Dynamic : Fair    ADL Intervention:       Grooming  Washing Hands: Contact guard assistance (standing at sink)  Brushing Teeth: Contact guard assistance (standing at sink)             Lower Body Dressing Assistance  Socks: Supervision/set-up; Compensatory technique training (in tailor sit, avoided deep bending, doffed/ donned)         Cognitive Retraining  Safety/Judgement: Awareness of environment; Insight into deficits;Good awareness of safety precautions    Patient instructed no asymmetrical reaching over head to ensure B UEs when shoulders >90* i.e. reaching in cabinets and dressing. Instruction on upper body dressing techniques of over head, then arms through to decrease pain and unilateral shoulder flexion >90*. Instruction on the benefits of utilizing B UEs during functional tasks i.e. opening the fridge, stepping into the tub. Instruction if continued pain at home with shoulder IR for BM hygiene can use wet wipes and toilet tongs PRN. Avoid valsalva maneuvers. May have to adjust home setup to increase ease with items closer to waist height to prevent deep bending and the automatic  of asymmetrical UE WB/pushing for stabilization during bending. Benefit to don clothing tailor sitting and don all clothing while sitting prior to standing. Patient demonstrated lower body dressing seated with SPV. Increase activity tolerance for home, work, and sexual intercourse by pacing self with increasing the arm exercises, sitting duration, frequency OOB, walking, standing, and ADLs. Instructed and indicated understanding of s/s of too much activity, how to respond to s/s safely. Pain:  Pain Scale 1: Numeric (0 - 10)  Pain Intensity 1: 3  Pain Location 1: Incisional  Pain Orientation 1: Anterior  Pain Description 1: Aching  Pain Intervention(s) 1: Medication (see MAR)  Activity Tolerance:   Vitals:    08/27/18 1221   BP: 117/70   BP 1 Location: Right arm   BP Patient Position: Sitting;Post activity   Pulse: (!) 104   SpO2: 97% on 4L O2 NC     Please refer to the flowsheet for vital signs taken during this treatment.   After treatment:   [x] Patient left in no apparent distress sitting up in chair  [] Patient left in no apparent distress in bed  [x] Call bell left within reach  [x] Nursing notified  [] Caregiver present  [] Bed alarm activated    COMMUNICATION/COLLABORATION:   The patients plan of care was discussed with: Physical Therapist and Registered Nurse    Lauren Penn OT  Time Calculation: 29 mins Authored by Resident/PA/NP

## 2021-07-20 NOTE — H&P PST ADULT - NEGATIVE GASTROINTESTINAL SYMPTOMS
Spoke with Rashida Raines's caregiver and sister about lab findings. I stated that the TSH was within normal limits, and there is no need to adjust Synthroid at this time.  Lipid panel was unremarkable except very mild elevation of cholesterol - at this time, continue current medication - simvastatin, but work on changing diet to include less fatty foods. I suggested a number of different options.  CMP grossly unremarkable.     Lito Pisano MD  PGY1 Family Medicine  02/18/18       no nausea/no vomiting/no diarrhea/no constipation/no abdominal pain/no hematochezia

## 2021-07-20 NOTE — H&P PST ADULT - NSICDXPASTMEDICALHX_GEN_ALL_CORE_FT
PAST MEDICAL HISTORY:  Acid reflux     Diabetes     Dyslipidemia     H/O herpes simplex infection     Insomnia     Obstructive sleep apnea (adult) (pediatric)     Overactive bladder

## 2021-07-26 ENCOUNTER — APPOINTMENT (OUTPATIENT)
Dept: DISASTER EMERGENCY | Facility: CLINIC | Age: 71
End: 2021-07-26

## 2021-07-26 LAB — SARS-COV-2 N GENE NPH QL NAA+PROBE: NOT DETECTED

## 2021-07-28 ENCOUNTER — TRANSCRIPTION ENCOUNTER (OUTPATIENT)
Age: 71
End: 2021-07-28

## 2021-07-29 ENCOUNTER — OUTPATIENT (OUTPATIENT)
Dept: OUTPATIENT SERVICES | Facility: HOSPITAL | Age: 71
LOS: 1 days | Discharge: HOME | End: 2021-07-29
Payer: COMMERCIAL

## 2021-07-29 ENCOUNTER — RESULT REVIEW (OUTPATIENT)
Age: 71
End: 2021-07-29

## 2021-07-29 ENCOUNTER — APPOINTMENT (OUTPATIENT)
Dept: OTOLARYNGOLOGY | Facility: AMBULATORY SURGERY CENTER | Age: 71
End: 2021-07-29

## 2021-07-29 VITALS
DIASTOLIC BLOOD PRESSURE: 67 MMHG | TEMPERATURE: 97 F | HEART RATE: 58 BPM | HEIGHT: 64 IN | OXYGEN SATURATION: 98 % | SYSTOLIC BLOOD PRESSURE: 134 MMHG | WEIGHT: 149.91 LBS | RESPIRATION RATE: 16 BRPM

## 2021-07-29 VITALS
SYSTOLIC BLOOD PRESSURE: 128 MMHG | TEMPERATURE: 98 F | OXYGEN SATURATION: 99 % | DIASTOLIC BLOOD PRESSURE: 97 MMHG | HEART RATE: 70 BPM | RESPIRATION RATE: 13 BRPM

## 2021-07-29 DIAGNOSIS — Z98.890 OTHER SPECIFIED POSTPROCEDURAL STATES: Chronic | ICD-10-CM

## 2021-07-29 DIAGNOSIS — Z98.49 CATARACT EXTRACTION STATUS, UNSPECIFIED EYE: Chronic | ICD-10-CM
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
## 2021-07-29 DIAGNOSIS — Z98.82 BREAST IMPLANT STATUS: Chronic | ICD-10-CM

## 2021-07-29 DIAGNOSIS — G47.33 OBSTRUCTIVE SLEEP APNEA (ADULT) (PEDIATRIC): ICD-10-CM

## 2021-07-29 LAB — GLUCOSE BLDC GLUCOMTR-MCNC: 129 MG/DL — HIGH (ref 70–99)

## 2021-07-29 PROCEDURE — 0466T INSRT CH WALL RESPIR ELTRD/RA & CONJ PULSE GEN: CPT

## 2021-07-29 PROCEDURE — 95970 ALYS NPGT W/O PRGRMG: CPT | Mod: 59

## 2021-07-29 PROCEDURE — 64568 OPN IMPLTJ CRNL NRV NEA&PG: CPT

## 2021-07-29 PROCEDURE — 71045 X-RAY EXAM CHEST 1 VIEW: CPT | Mod: 26

## 2021-07-29 PROCEDURE — 64716 REVISION OF CRANIAL NERVE: CPT

## 2021-07-29 RX ORDER — OXYCODONE HYDROCHLORIDE 5 MG/1
1 TABLET ORAL
Qty: 20 | Refills: 0
Start: 2021-07-29

## 2021-07-29 RX ORDER — CHOLECALCIFEROL (VITAMIN D3) 125 MCG
1 CAPSULE ORAL
Qty: 0 | Refills: 0 | DISCHARGE

## 2021-07-29 RX ORDER — CEPHALEXIN 500 MG
1 CAPSULE ORAL
Qty: 20 | Refills: 0
Start: 2021-07-29 | End: 2021-08-07

## 2021-07-29 RX ORDER — OXYBUTYNIN CHLORIDE 5 MG
1 TABLET ORAL
Qty: 0 | Refills: 0 | DISCHARGE

## 2021-07-29 RX ORDER — METOPROLOL TARTRATE 50 MG
1 TABLET ORAL
Qty: 0 | Refills: 0 | DISCHARGE

## 2021-07-29 RX ORDER — METFORMIN HYDROCHLORIDE 850 MG/1
0 TABLET ORAL
Qty: 0 | Refills: 0 | DISCHARGE

## 2021-07-29 RX ORDER — ROSUVASTATIN CALCIUM 5 MG/1
1 TABLET ORAL
Qty: 0 | Refills: 0 | DISCHARGE

## 2021-07-29 RX ORDER — OMEGA-3 ACID ETHYL ESTERS 1 G
1 CAPSULE ORAL
Qty: 0 | Refills: 0 | DISCHARGE

## 2021-07-29 NOTE — ASU PREOPERATIVE ASSESSMENT, ADULT ([facility]ARK ONLY) - PROCEDURE
cranial nerve stimulation placement insertion of chest wall, respiratory sensor, electrode including conection to pulse generator

## 2021-07-29 NOTE — ASU DISCHARGE PLAN (ADULT/PEDIATRIC) - CALL YOUR DOCTOR IF YOU HAVE ANY OF THE FOLLOWING:
Bleeding that does not stop/Numbness, tingling, color or temperature change to extremity Bleeding that does not stop/Pain not relieved by Medications/Wound/Surgical Site with redness, or foul smelling discharge or pus/Numbness, tingling, color or temperature change to extremity

## 2021-07-29 NOTE — ASU DISCHARGE PLAN (ADULT/PEDIATRIC) - CARE PROVIDER_API CALL
Iggy Borges; ALCON)  Otolaryngology  [address] [place] [address] [id number]  Phone: [phone]  Fax: [phone]  Follow Up Time:

## 2021-07-29 NOTE — ASU DISCHARGE PLAN (ADULT/PEDIATRIC) - PATIENT EDUCATION MATERIALS PROVIED
patioent post-operative Inspire for sleap apnea instructions/Pre-printed instructions given for other (specify)

## 2021-08-06 ENCOUNTER — APPOINTMENT (OUTPATIENT)
Dept: OTOLARYNGOLOGY | Facility: CLINIC | Age: 71
End: 2021-08-06
Payer: COMMERCIAL

## 2021-08-06 PROCEDURE — 99024 POSTOP FOLLOW-UP VISIT: CPT

## 2021-08-10 PROBLEM — G47.33 OBSTRUCTIVE SLEEP APNEA (ADULT) (PEDIATRIC): Chronic | Status: ACTIVE | Noted: 2021-07-20

## 2021-08-17 NOTE — HISTORY OF PRESENT ILLNESS
[de-identified] : pt healing well. Pt states there is little  to no pain, denies drainage or swelling at incision. Pt admits to restricting arm for 5 days.\par

## 2021-08-17 NOTE — PHYSICAL EXAM
[de-identified] : neck and chest incision clean dry intact no edema no erythema no exudates two dressing removed. [de-identified] : Septum is midline

## 2021-08-30 ENCOUNTER — APPOINTMENT (OUTPATIENT)
Dept: PULMONOLOGY | Facility: CLINIC | Age: 71
End: 2021-08-30
Payer: COMMERCIAL

## 2021-08-30 PROCEDURE — 99214 OFFICE O/P EST MOD 30 MIN: CPT

## 2021-08-30 NOTE — ASSESSMENT
[FreeTextEntry1] : severe JERE with Inspire at above settings.  Pt will have f/u sleep study in a few months.  F/U 3 months.

## 2021-08-30 NOTE — HISTORY OF PRESENT ILLNESS
[TextBox_4] : Here for Inspire programming.  Set at 1.1, drop down 1-2 v, 35 min delay.  9 hrs time of activity

## 2021-08-30 NOTE — REASON FOR VISIT
[Follow-Up] : a follow-up visit [Sleep Apnea] : sleep apnea [[employer] ] : provided by [employer]   [FreeTextEntry1] : [id number] [FreeTextEntry2] : Evans [TWNoteComboBox1] : Dominican

## 2021-09-14 NOTE — H&P PST ADULT - RESPIRATORY AND THORAX
WRJ: Chest CT showing improving pleural effusion. Patient and family informed. Patient stable and ready for discharge. Family understands care plan.
details…

## 2021-10-12 ENCOUNTER — APPOINTMENT (OUTPATIENT)
Dept: PULMONOLOGY | Facility: CLINIC | Age: 71
End: 2021-10-12
Payer: COMMERCIAL

## 2021-10-12 VITALS
RESPIRATION RATE: 16 BRPM | OXYGEN SATURATION: 98 % | BODY MASS INDEX: 24.41 KG/M2 | SYSTOLIC BLOOD PRESSURE: 126 MMHG | HEIGHT: 64 IN | WEIGHT: 143 LBS | DIASTOLIC BLOOD PRESSURE: 72 MMHG | HEART RATE: 80 BPM

## 2021-10-12 PROCEDURE — 99214 OFFICE O/P EST MOD 30 MIN: CPT

## 2021-10-12 NOTE — ASSESSMENT
[FreeTextEntry1] : Severe sleep apnea with at least partial response to inspire therapy. We will get a repeat sleep study done to see if her sleep apnea is adequately addressed. I will see her back after that.

## 2021-10-12 NOTE — HISTORY OF PRESENT ILLNESS
[TextBox_4] : Patient has been sleeping with inspire, she feels she snoring a little bit but she's sleeping better. He is bothered by some delayed sleep phase, not being able to fall asleep until midnight and sleeping late in the morning. She takes zolpidem at night.

## 2021-11-10 ENCOUNTER — FORM ENCOUNTER (OUTPATIENT)
Age: 71
End: 2021-11-10

## 2021-11-11 ENCOUNTER — APPOINTMENT (OUTPATIENT)
Age: 71
End: 2021-11-11
Payer: COMMERCIAL

## 2021-11-11 ENCOUNTER — OUTPATIENT (OUTPATIENT)
Dept: OUTPATIENT SERVICES | Facility: HOSPITAL | Age: 71
LOS: 1 days | End: 2021-11-11
Payer: COMMERCIAL

## 2021-11-11 DIAGNOSIS — Z98.890 OTHER SPECIFIED POSTPROCEDURAL STATES: Chronic | ICD-10-CM
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
## 2021-11-11 DIAGNOSIS — G47.33 OBSTRUCTIVE SLEEP APNEA (ADULT) (PEDIATRIC): ICD-10-CM

## 2021-11-11 DIAGNOSIS — Z98.49 CATARACT EXTRACTION STATUS, UNSPECIFIED EYE: Chronic | ICD-10-CM

## 2021-11-11 DIAGNOSIS — Z98.82 BREAST IMPLANT STATUS: Chronic | ICD-10-CM

## 2021-11-11 PROCEDURE — 95810 POLYSOM 6/> YRS 4/> PARAM: CPT | Mod: 26

## 2021-11-11 PROCEDURE — 95810 POLYSOM 6/> YRS 4/> PARAM: CPT

## 2022-01-04 ENCOUNTER — APPOINTMENT (OUTPATIENT)
Dept: PULMONOLOGY | Facility: CLINIC | Age: 72
End: 2022-01-04
Payer: COMMERCIAL

## 2022-01-04 VITALS
BODY MASS INDEX: 24.37 KG/M2 | DIASTOLIC BLOOD PRESSURE: 76 MMHG | OXYGEN SATURATION: 98 % | SYSTOLIC BLOOD PRESSURE: 144 MMHG | WEIGHT: 142 LBS | HEART RATE: 82 BPM | RESPIRATION RATE: 16 BRPM

## 2022-01-04 PROCEDURE — 99214 OFFICE O/P EST MOD 30 MIN: CPT

## 2022-01-04 NOTE — ASSESSMENT
[FreeTextEntry1] : Patient with moderate obstructive sleep apnea with good response to inspire therapy.\par \par I gave her a clearance note for her MRI with the phone number for [employer] tech support for the radiologist to call for guidelines.\par \par Otherwise followup as needed.

## 2022-01-04 NOTE — HISTORY OF PRESENT ILLNESS
[TextBox_4] : Patient came in to discuss her recent sleep study that was done with the inspire device in place. She is going for a neck MRI this week for some neck discomfort. [Obstructive Sleep Apnea] : obstructive sleep apnea [Lab] : lab [TextBox_100] : 11/21 [TextBox_108] : 1.6 [TextBox_112] : 96 [TextBox_116] : 86 [TextBox_120] : With Inspire on [TextBox_165] : I reviewed the patient's sleep study with the patient.\par

## 2022-07-19 ENCOUNTER — EMERGENCY (EMERGENCY)
Facility: HOSPITAL | Age: 72
LOS: 1 days | Discharge: HOME | End: 2022-07-19
Attending: EMERGENCY MEDICINE
Payer: COMMERCIAL

## 2022-07-19 VITALS
SYSTOLIC BLOOD PRESSURE: 137 MMHG | DIASTOLIC BLOOD PRESSURE: 82 MMHG | TEMPERATURE: 98 F | RESPIRATION RATE: 20 BRPM | OXYGEN SATURATION: 97 % | WEIGHT: 145.06 LBS | HEART RATE: 79 BPM | HEIGHT: 64 IN

## 2022-07-19 DIAGNOSIS — Z98.49 CATARACT EXTRACTION STATUS, UNSPECIFIED EYE: Chronic | ICD-10-CM
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
## 2022-07-19 DIAGNOSIS — Z98.82 BREAST IMPLANT STATUS: Chronic | ICD-10-CM

## 2022-07-19 DIAGNOSIS — Z98.890 OTHER SPECIFIED POSTPROCEDURAL STATES: Chronic | ICD-10-CM

## 2022-07-19 LAB
ALBUMIN SERPL ELPH-MCNC: 4.7 G/DL — SIGNIFICANT CHANGE UP (ref 3.3–5.2)
ALP SERPL-CCNC: 51 U/L — SIGNIFICANT CHANGE UP (ref 40–120)
ALT FLD-CCNC: 28 U/L — SIGNIFICANT CHANGE UP
ANION GAP SERPL CALC-SCNC: 11 MMOL/L — SIGNIFICANT CHANGE UP (ref 5–17)
AST SERPL-CCNC: 23 U/L — SIGNIFICANT CHANGE UP
BASOPHILS # BLD AUTO: 0.03 K/UL — SIGNIFICANT CHANGE UP (ref 0–0.2)
BASOPHILS NFR BLD AUTO: 0.7 % — SIGNIFICANT CHANGE UP (ref 0–2)
BILIRUB SERPL-MCNC: 0.5 MG/DL — SIGNIFICANT CHANGE UP (ref 0.4–2)
BUN SERPL-MCNC: 11.8 MG/DL — SIGNIFICANT CHANGE UP (ref 8–20)
CALCIUM SERPL-MCNC: 9.5 MG/DL — SIGNIFICANT CHANGE UP (ref 8.6–10.2)
CHLORIDE SERPL-SCNC: 106 MMOL/L — SIGNIFICANT CHANGE UP (ref 98–107)
CO2 SERPL-SCNC: 25 MMOL/L — SIGNIFICANT CHANGE UP (ref 22–29)
CREAT SERPL-MCNC: 0.59 MG/DL — SIGNIFICANT CHANGE UP (ref 0.5–1.3)
EGFR: 96 ML/MIN/1.73M2 — SIGNIFICANT CHANGE UP
EOSINOPHIL # BLD AUTO: 0.08 K/UL — SIGNIFICANT CHANGE UP (ref 0–0.5)
EOSINOPHIL NFR BLD AUTO: 1.7 % — SIGNIFICANT CHANGE UP (ref 0–6)
GLUCOSE SERPL-MCNC: 145 MG/DL — HIGH (ref 70–99)
HCT VFR BLD CALC: 41.9 % — SIGNIFICANT CHANGE UP (ref 34.5–45)
HGB BLD-MCNC: 14.4 G/DL — SIGNIFICANT CHANGE UP (ref 11.5–15.5)
IMM GRANULOCYTES NFR BLD AUTO: 0.2 % — SIGNIFICANT CHANGE UP (ref 0–1.5)
LYMPHOCYTES # BLD AUTO: 1.77 K/UL — SIGNIFICANT CHANGE UP (ref 1–3.3)
LYMPHOCYTES # BLD AUTO: 38.6 % — SIGNIFICANT CHANGE UP (ref 13–44)
MAGNESIUM SERPL-MCNC: 2.4 MG/DL — SIGNIFICANT CHANGE UP (ref 1.6–2.6)
MCHC RBC-ENTMCNC: 31.9 PG — SIGNIFICANT CHANGE UP (ref 27–34)
MCHC RBC-ENTMCNC: 34.4 GM/DL — SIGNIFICANT CHANGE UP (ref 32–36)
MCV RBC AUTO: 92.9 FL — SIGNIFICANT CHANGE UP (ref 80–100)
MONOCYTES # BLD AUTO: 0.66 K/UL — SIGNIFICANT CHANGE UP (ref 0–0.9)
MONOCYTES NFR BLD AUTO: 14.4 % — HIGH (ref 2–14)
NEUTROPHILS # BLD AUTO: 2.03 K/UL — SIGNIFICANT CHANGE UP (ref 1.8–7.4)
NEUTROPHILS NFR BLD AUTO: 44.4 % — SIGNIFICANT CHANGE UP (ref 43–77)
PLATELET # BLD AUTO: 115 K/UL — LOW (ref 150–400)
POTASSIUM SERPL-MCNC: 3.9 MMOL/L — SIGNIFICANT CHANGE UP (ref 3.5–5.3)
POTASSIUM SERPL-SCNC: 3.9 MMOL/L — SIGNIFICANT CHANGE UP (ref 3.5–5.3)
PROT SERPL-MCNC: 7.5 G/DL — SIGNIFICANT CHANGE UP (ref 6.6–8.7)
RAPID RVP RESULT: SIGNIFICANT CHANGE UP
RBC # BLD: 4.51 M/UL — SIGNIFICANT CHANGE UP (ref 3.8–5.2)
RBC # FLD: 12.4 % — SIGNIFICANT CHANGE UP (ref 10.3–14.5)
SARS-COV-2 RNA SPEC QL NAA+PROBE: SIGNIFICANT CHANGE UP
SODIUM SERPL-SCNC: 142 MMOL/L — SIGNIFICANT CHANGE UP (ref 135–145)
T4 AB SER-ACNC: 6.4 UG/DL — SIGNIFICANT CHANGE UP (ref 4.5–12)
TROPONIN T SERPL-MCNC: <0.01 NG/ML — SIGNIFICANT CHANGE UP (ref 0–0.06)
TSH SERPL-MCNC: 5.15 UIU/ML — HIGH (ref 0.27–4.2)
WBC # BLD: 4.58 K/UL — SIGNIFICANT CHANGE UP (ref 3.8–10.5)
WBC # FLD AUTO: 4.58 K/UL — SIGNIFICANT CHANGE UP (ref 3.8–10.5)

## 2022-07-19 PROCEDURE — 99285 EMERGENCY DEPT VISIT HI MDM: CPT

## 2022-07-19 PROCEDURE — 93010 ELECTROCARDIOGRAM REPORT: CPT

## 2022-07-19 PROCEDURE — 71045 X-RAY EXAM CHEST 1 VIEW: CPT | Mod: 26

## 2022-07-19 NOTE — ED STATDOCS - PROGRESS NOTE DETAILS
Ermelinda FAY for ED attending, Dr. Santiago. 72 y/o female with PMHx of DM ,and HTN with 3 days of dizziness, shakiness, fatigue and chest discomfort. Will send to main for further evaluation.

## 2022-07-19 NOTE — ED ADULT TRIAGE NOTE - BEFAST SPEECH PHRASE
1/20/2020        RE: Alejandrina Whatley  [address]  [facility] [address]        [facility]  [place] Medical Record Number:  [id number]  Place of Service where encounter took place:  [facility] ([facility]) [[id number]]  Chief Complaint   Patient presents with     RECHECK       HPI:    Alejandrina Whatley  is a 89 year old (8/22/1924), who is being seen today for an episodic care visit.  HPI information obtained from: facility chart records, facility staff, patient report and [facility] chart review.     Brief Summary of Hospital/TCU Course: Ms. Alejandrina Whatley is a 89 year old female with PMH significant for hypertension, hyperlipidemia and dementia. She was admitted to [facility] from 12/3/19-12/6/19 for weakness, dizziness, and labile blood pressures. Found to have new diagnosis of right putamen CVA on 12/4/19 with left sided weakness. Continues on plavix + baby ASA through 12/24 (21 day course), then to start  mg daily. Also started on atorvastatin, coreg and amlodipine for HTN urgency with goal SBP around 140. Was then readmitted to [facility] 12/7-12/9 for labile blood pressures, weakness and dizziness. Discharged to TCU for physical rehabilitation. While at TCU developed UTI  with urine culture showing >100,000 col/ml Klebsiella pneumoniae. Treated with 7 days of cefdinir.    On 1/8/20 when she was instructed by Neurologist to go to ED for subacute CVA on MRI that was completed 1/7/20. She was hospitalized at [facility] from 1/8-1/10. Neurology was consulted and reviewed MRI and confirmed subacute CVA in left cerebellar and right MCA territory. CTA of head and neck showed mild stenosis of MCA. TTE and ziopatch were recommended, but family declined additional work up to limit excessive tests, medications and hospitalizations. They also declined wanting to have further evaluation with outpatient neurology. Recommended to complete ASA 81 mg + Plavix  daily x21 days until 1/30/20 and then restart  mg daily indefinitely. [facility].     Today's concern is:  Alejandrina was seen today for routine follow up at TCU. Reports she is doing well. Had therapy this morning and is exhausted from it. Feels therapy is going well. Ambulating 34 feet with therapy today. Denies any pain today. Reports feeling constipated and is interested in trying 1/2 dose of miralax daily. Denies abdominal pain, nausea, SOB, CP, dizzziness/lightheadedness. No dysuria or trouble voiding. SBP 120s-130s. HR 60s-70s, oxygen saturations >94% on RA.     Past Medical and Surgical History reviewed in Epic today.    MEDICATIONS:  Current Outpatient Medications   Medication Sig Dispense Refill     acetaminophen (TYLENOL) 500 MG tablet Take 1,000 mg by mouth 3 times daily 8am, 2pm, 8pm       amLODIPine (NORVASC) 5 MG tablet Take 1 tablet (5 mg) by mouth daily 30 tablet 0     aspirin (ASA) 81 MG EC tablet Take 1 tablet (81 mg) by mouth daily for 20 days 20 tablet 0     atorvastatin (LIPITOR) 40 MG tablet Take 1 tablet (40 mg) by mouth every evening 30 tablet 0     carvedilol (COREG) 6.25 MG tablet Take 1 tablet (6.25 mg) by mouth 2 times daily (with meals) 60 tablet 0     Cholecalciferol (VITAMIN D3) 50 MCG (2000 UT) TABS Take 2,000 Units by mouth daily       citalopram (CELEXA) 10 MG tablet Take 10 mg by mouth every evening       clopidogrel (PLAVIX) 75 MG tablet Take 1 tablet (75 mg) by mouth daily for 20 days 20 tablet 0     dorzolamide-timolol (COSOPT) 2-0.5 % ophthalmic solution Place 1 drop into both eyes 2 times daily 1 Bottle 11     latanoprost (XALATAN) 0.005 % ophthalmic solution Place 1 drop into both eyes At Bedtime Both Eyes 3 Bottle 4     melatonin 3 MG tablet Take 1 tablet (3 mg) by mouth nightly as needed for sleep       Multiple Vitamins-Minerals (CERTAVITE/ANTIOXIDANTS PO) Take 1 tablet by mouth daily       polyethylene glycol  "(MIRALAX/GLYCOLAX) packet Take 17 g by mouth daily as needed for constipation        senna-docusate (SENOKOT-S/PERICOLACE) 8.6-50 MG tablet Take 1 tablet by mouth daily       traMADol (ULTRAM) 50 MG tablet Take 0.5 tablets (25 mg) by mouth At Bedtime 20 tablet 0     [START ON 1/31/2020] aspirin (ASA) 325 MG tablet Take 1 tablet (325 mg) by mouth daily (Patient not taking: Reported on 1/14/2020) 30 tablet 0     REVIEW OF SYSTEMS:  10 point ROS of systems including Constitutional, Eyes, Respiratory, Cardiovascular, Gastroenterology, Genitourinary, Integumentary, Musculoskeletal, Psychiatric were all negative except for pertinent positives noted in my HPI.    Objective:  /79   Pulse 71   Temp 97.9  F (36.6  C)   Resp 18   Ht 1.473 m (4' 10\")   Wt 59.8 kg (131 lb 12.8 oz)   SpO2 98%   BMI 27.55 kg/m     Exam:  GENERAL APPEARANCE:  Alert, pleasant and cooperative, elderly female lying in bed on exam  HEENT: normocephalic, conjunctivae, lids, pupils and irises normal, moist mucous membranes, nose without drainage or crusting, hearing acuity: intact  RESP:  respiratory effort normal, no respiratory distress, Lung sounds clear, patient is on RA  CV: auscultation of heart done, rate and rhythm regular. no murmur, no rub or gallop.  ABDOMEN: + bowel sounds, soft, nontender, no grimacing or guarding with palpation.  M/S:   Gait and station: uses walker for ambulation; no tenderness or swelling of the joints; able to move all extremities - with left sided weakness  SKIN:  Inspection and palpation of skin and subcutaneous tissue: skin warm, dry without rashes  NEURO cranial nerves 2-12 grossly intact and at patient's baseline: no facial asymmetry, no speech deficits and able to follow directions  PSYCH: affect and mood normal    Labs:   Labs done in SNF are in [facility]. Please refer to them using EPIC/Care Everywhere. and Recent labs in EPIC reviewed by me today.     ASSESSMENT/PLAN:  (I69.354) Hemiparesis " affecting left side as late effect of cerebrovascular accident (CVA) (H)   (I63.9) CVA (H)  Comment: acute on chronic with left sided weakness  -initially with right putamen CVA d/t small vessel occlusion diagnosed 12/4. Treated with 21 day course of baby ASA + plavix at TCU. Also started on atorvastatin at that time due to .  - most recently with subacute CVA in left cerebellar and right MCA territory  Plan: Continue 21 day course of baby ASA + plavix through 1/30/20, then restart  mg daily. Continue atorvastatin. Therapy as below. Family declined JAYASHREE and ziopatch to look for embolic sources with request to limit excessive tests, medications and hospitalizations. They also declined Neurology follow up outpatient which was recommended in 4-8 weeks.      (I10) Benign essential hypertension  Comment: acute on chronic, BP reviewed and is meeting goal   -goal SBP around 140  -PTA atenolol discontinued during initial hospitalization  Plan: Continue amlodipine, coreg. Vs per TCU policy. Monitor.      (K59.00) Constipation  Comment: acute, suspect scheduled tramadol may be exacerbating  Plan: Start miralax 7.5 grams daily. Continue senna S at bedtime and miralax daily PRN. Nursing to monitor bowels and use house orders PRN- notify provider if used to adjust daily regimen.    (M51.26) History of Lumbar herniated disc  Comment: chronic, patient denies pain, no reports of pain documented per nursing and therapy  -tramadol was restarted at insistence of daughter at last visit (1/14). It was initially stopped on 12/17 with concerns for delirium and MD during hospitalization also reinforced side effects of medication to daughter. I also previously had discussed side effects and how tramadol works with daughter at TCU prior to recent hospitalization, as patient has had no complaints of pain at TCU and daughter had thought this would prevent further herniation to her mother's back. However, goals of care are  ultimately for patient comfort.  Plan: Continue tramadol 25 mg po at bedtime. Continue scheduled tylenol. Discontinue tramadol if increased confusion or falls. Monitor.       (F03.90) Dementia without behavioral disturbance, unspecified dementia type (H)  Comment: Acute, CPT 4.2/5.6 indicating 24 hour supervision, SLUMS 14/30  Plan: Nursing to continue to provide supportive cares. Plan is for move to LTC when finished with therapy in TCU.      (F32.9) Depression, unspecified depression type  Comment: chronic, baseline  Plan: Continue PTA celexa. Monitor.      (R53.81) Physical deconditioning  Comment: Acute, secondary to recent hospitalization, medical conditions as above- making progress with therapy as above.  Plan: Encourage participation in physical therapy/occupational therapy for strengthening and deconditioning. Discharge planning per their recommendation. Social work to assist with d/c planning.     (G47.00) Insomnia  Comment: acute, sleeping well  -melatonin started at TCU  Plan: Melatonin 3 mg at bedtime for insomnia. Monitor.     (N39.0) UTI-resolved  Comment: Acute, resolved   - UC grew >100,000 col/ml Klebsiella pneumoniae  Plan: Completed course of Cefdinir 300 mg po BID x 7 days. Monitor.    Electronically signed by:  CRESCENCIO Narayanan CNP       Sincerely,        CRESCENCIO Narayanan CNP     Yes

## 2022-07-19 NOTE — ED PROVIDER NOTE - OBJECTIVE STATEMENT
70yo female with pmh of DM and vertigo on meclizine presents with dizziness and chest pain and palpitations. Pt states for 3 days with dizziness, worse when walking and moving her head but not room spinning and not similar to when she was given the meclizine. States she feels unsteady on her feet and feels total body weakness and a slight HA moving around her head and at times generalized tremors. Pt early afternoon also had palpitations and a chest discomfort along with the dizziness. Pt denies fevers/chills, loc, focal neuro deficits, sob/palp, cough, abd pain/n/v/d, urinary symptoms, recent travel and sick contacts.

## 2022-07-19 NOTE — ED PROVIDER NOTE - PATIENT PORTAL LINK FT
You can access the FollowMyHealth Patient Portal offered by [facility] by registering at the following website: [email or web address]. By joining [facility]’s FollowMyHealth portal, you will also be able to view your health information using other applications (apps) compatible with our system.

## 2022-07-19 NOTE — ED PROVIDER NOTE - PHYSICAL EXAMINATION
Const: Awake, alert and oriented. In no acute distress. Well appearing.  HEENT: NC/AT. Moist mucous membranes.  Eyes: No scleral icterus. EOMI.  Neck:. Soft and supple. Full ROM without pain.  Cardiac: Regular rate and regular rhythm. +S1/S2. Peripheral pulses 2+ and symmetric. No LE edema.  Resp: Speaking in full sentences. No evidence of respiratory distress. No wheezes, rales or rhonchi.  Abd: Soft, non-tender, non-distended. Normal bowel sounds in all 4 quadrants. No guarding or rebound.  Back: Spine midline and non-tender. No CVAT.  Skin: No rashes, abrasions or lacerations.  Lymph: No cervical lymphadenopathy.  Neuro: A&Ox3, moving all extremities symmetrically, follows commands, motor cesar upper and lower ext 5/5, sensory symm and intact CN 2-12 grossly intact, no ataxia, no nystagmus, no dysmetria, no ddk, symm cesar, no pronator drift

## 2022-07-19 NOTE — ED PROVIDER NOTE - NSFOLLOWUPINSTRUCTIONS_ED_ALL_ED_FT
- Follow up with your doctor within 2-3 days.   - Bring results with you to the appointment.   - Return to the ED for any new or worsening symptoms.     Dizziness    Dizziness can manifest as a feeling of unsteadiness or light-headedness. You may feel like you are about to faint. This condition can be caused by a number of things, including medicines, dehydration, or illness. Drink enough fluid to keep your urine clear or pale yellow. Do not drink alcohol and limit your caffeine intake. Avoid quick or sudden movements.  Rise slowly from chairs and steady yourself until you feel okay. In the morning, first sit up on the side of the bed.    SEEK IMMEDIATE MEDICAL CARE IF YOU HAVE ANY OF THE FOLLOWING SYMPTOMS: vomiting, changes in your vision or speech, weakness in your arms or legs, trouble speaking or swallowing, chest pain, abdominal pain, shortness of breath, sweating, bleeding, headache, neck pain, or fever.

## 2022-07-19 NOTE — ED PROVIDER NOTE - CLINICAL SUMMARY MEDICAL DECISION MAKING FREE TEXT BOX
72yo female with pmh of DM and vertigo on meclizine presents with dizziness and chest pain and palpitations. Eval for ACS and electrolyte/metabolic abn also eval for TIA, pt neuro intact at this time ekg, labs, trop, cxr, CTH

## 2022-07-20 VITALS
TEMPERATURE: 98 F | RESPIRATION RATE: 17 BRPM | HEART RATE: 80 BPM | SYSTOLIC BLOOD PRESSURE: 139 MMHG | OXYGEN SATURATION: 98 % | DIASTOLIC BLOOD PRESSURE: 83 MMHG

## 2022-07-20 LAB
APPEARANCE UR: CLEAR — SIGNIFICANT CHANGE UP
BILIRUB UR-MCNC: NEGATIVE — SIGNIFICANT CHANGE UP
COLOR SPEC: YELLOW — SIGNIFICANT CHANGE UP
DIFF PNL FLD: NEGATIVE — SIGNIFICANT CHANGE UP
GLUCOSE UR QL: 1000 MG/DL
KETONES UR-MCNC: NEGATIVE — SIGNIFICANT CHANGE UP
LEUKOCYTE ESTERASE UR-ACNC: NEGATIVE — SIGNIFICANT CHANGE UP
NITRITE UR-MCNC: NEGATIVE — SIGNIFICANT CHANGE UP
PH UR: 6 — SIGNIFICANT CHANGE UP (ref 5–8)
PROT UR-MCNC: NEGATIVE — SIGNIFICANT CHANGE UP
SP GR SPEC: 1.02 — SIGNIFICANT CHANGE UP (ref 1.01–1.02)
UROBILINOGEN FLD QL: NEGATIVE MG/DL — SIGNIFICANT CHANGE UP

## 2022-07-20 PROCEDURE — 80053 COMPREHEN METABOLIC PANEL: CPT

## 2022-07-20 PROCEDURE — 83735 ASSAY OF MAGNESIUM: CPT

## 2022-07-20 PROCEDURE — 99285 EMERGENCY DEPT VISIT HI MDM: CPT | Mod: 25

## 2022-07-20 PROCEDURE — 93005 ELECTROCARDIOGRAM TRACING: CPT

## 2022-07-20 PROCEDURE — 81003 URINALYSIS AUTO W/O SCOPE: CPT

## 2022-07-20 PROCEDURE — 71045 X-RAY EXAM CHEST 1 VIEW: CPT

## 2022-07-20 PROCEDURE — 70450 CT HEAD/BRAIN W/O DYE: CPT | Mod: 26,MA

## 2022-07-20 PROCEDURE — 0225U NFCT DS DNA&RNA 21 SARSCOV2: CPT

## 2022-07-20 PROCEDURE — 87086 URINE CULTURE/COLONY COUNT: CPT

## 2022-07-20 PROCEDURE — 84443 ASSAY THYROID STIM HORMONE: CPT

## 2022-07-20 PROCEDURE — 70450 CT HEAD/BRAIN W/O DYE: CPT | Mod: MA

## 2022-07-20 PROCEDURE — 84436 ASSAY OF TOTAL THYROXINE: CPT

## 2022-07-20 PROCEDURE — 84484 ASSAY OF TROPONIN QUANT: CPT

## 2022-07-20 PROCEDURE — 36415 COLL VENOUS BLD VENIPUNCTURE: CPT

## 2022-07-20 PROCEDURE — 85025 COMPLETE CBC W/AUTO DIFF WBC: CPT

## 2022-07-20 NOTE — ED ADULT NURSE NOTE - EXTENSIONS OF SELF_ADULT
Physical Exam  Mallampati: II  TM Distance: <3 FB  Neck ROM: Full  Cardio Rhythm: Regular  Cardio Rate: Normal  Breath sounds clear to auscultation:  Yes  Patient Demonstrates:  Obese      Legend: C=Chipped  M=Missing  L=LooseRisks of dental damage discussed and understood by patient  Pt does not want sedation      Anesthesia Plan  ASA Status: 2  Anesthesia Type: MAC  Reviewed: Consultations, Patient Summary, Allergies, Problem List, Lab Results, Nursing Notes, Pre-Induction Reassessment, NPO Status, Medications and Past Med History  The proposed anesthetic plan, including its risks and benefits, have been discussed with the Patient - along with the risks and benefits of alternatives.  Questions were encouraged and answered and the patient and/or representative agrees to proceed.  Blood Products: Not Anticipated      Anesthesia ROS/Med Hx        Pulmonary Review:    Negative for pulmonary    Neuro/Psych Review:    Pt. positive for neuromuscular disease    Cardiovascular Review:    Pt. positive for hypertension    GI/HEPATIC/RENAL Review:    Pt. negative for GI/Hepatic/Renal ROS    End/Other Review:    Pt. positive for obesity      None

## 2022-07-20 NOTE — ED ADULT NURSE NOTE - CHIEF COMPLAINT QUOTE
Patient ambulated into ED with steady gait, Pt c/o dizzy for 3 days denies pain no nausea or vomiting

## 2022-07-20 NOTE — ED ADULT NURSE NOTE - OBJECTIVE STATEMENT
Patient ambulated with steady gait, Pt c/o dizzy for 3 days denies pain no nausea or vomiting at this time

## 2022-07-21 LAB
CULTURE RESULTS: SIGNIFICANT CHANGE UP
SPECIMEN SOURCE: SIGNIFICANT CHANGE UP

## 2022-10-06 ENCOUNTER — APPOINTMENT (OUTPATIENT)
Dept: PULMONOLOGY | Facility: CLINIC | Age: 72
End: 2022-10-06

## 2022-10-06 VITALS
WEIGHT: 132 LBS | SYSTOLIC BLOOD PRESSURE: 126 MMHG | RESPIRATION RATE: 16 BRPM | BODY MASS INDEX: 22.53 KG/M2 | OXYGEN SATURATION: 98 % | HEIGHT: 64 IN | DIASTOLIC BLOOD PRESSURE: 60 MMHG | HEART RATE: 76 BPM

## 2022-10-06 DIAGNOSIS — R49.0 DYSPHONIA: ICD-10-CM

## 2022-10-06 PROCEDURE — 99214 OFFICE O/P EST MOD 30 MIN: CPT

## 2022-10-06 RX ORDER — FAMOTIDINE 20 MG/1
20 TABLET, FILM COATED ORAL DAILY
Qty: 30 | Refills: 5 | Status: ACTIVE | COMMUNITY
Start: 2022-10-06 | End: 1900-01-01

## 2022-10-06 NOTE — HISTORY OF PRESENT ILLNESS
[TextBox_4] : Patient with moderate obstructive sleep apnea with excellent response to inspire therapy.  She continues to sleep well.  She comes in now complaining of some hoarseness which occurs throughout the day and is somewhat intermittent.  She was concerned that this might be related to the inspire.\par \par

## 2022-10-06 NOTE — ASSESSMENT
[FreeTextEntry1] : Patient with some hoarseness.  I explained to her that the inspire is probably not related as it addresses a different part of the throat.  She may have some residual GERD leading to hoarseness.  I ordered famotidine 20 mg daily for her.  I told her to take it for a few weeks.  If it is better, she should stay on it.  If it is not better, then she needs to go back to ENT for laryngoscopy.  Follow-up in a year.

## 2023-01-17 ENCOUNTER — APPOINTMENT (OUTPATIENT)
Dept: OTOLARYNGOLOGY | Facility: CLINIC | Age: 73
End: 2023-01-17

## 2023-01-19 ENCOUNTER — ASC (OUTPATIENT)
Dept: URBAN - METROPOLITAN AREA SURGERY 8 | Facility: SURGERY | Age: 73
Setting detail: OPHTHALMOLOGY
End: 2023-01-19
Payer: COMMERCIAL

## 2023-01-19 ENCOUNTER — OFFICE (OUTPATIENT)
Dept: URBAN - METROPOLITAN AREA CLINIC 94 | Facility: CLINIC | Age: 73
Setting detail: OPHTHALMOLOGY
End: 2023-01-19
Payer: COMMERCIAL

## 2023-01-19 DIAGNOSIS — H35.3213: ICD-10-CM

## 2023-01-19 DIAGNOSIS — H43.812: ICD-10-CM

## 2023-01-19 DIAGNOSIS — E11.3292: ICD-10-CM

## 2023-01-19 DIAGNOSIS — E11.3511: ICD-10-CM

## 2023-01-19 DIAGNOSIS — H34.8310: ICD-10-CM

## 2023-01-19 DIAGNOSIS — H35.3122: ICD-10-CM

## 2023-01-19 PROCEDURE — 92014 COMPRE OPH EXAM EST PT 1/>: CPT | Performed by: SPECIALIST

## 2023-01-19 PROCEDURE — 67210 TREATMENT OF RETINAL LESION: CPT | Performed by: SPECIALIST

## 2023-01-19 PROCEDURE — 92235 FLUORESCEIN ANGRPH MLTIFRAME: CPT | Performed by: SPECIALIST

## 2023-01-19 PROCEDURE — 92134 CPTRZ OPH DX IMG PST SGM RTA: CPT | Performed by: SPECIALIST

## 2023-01-19 ASSESSMENT — KERATOMETRY
OS_K2POWER_DIOPTERS: 44.25
OD_K1POWER_DIOPTERS: 43.50
OS_K1POWER_DIOPTERS: 44.00
OS_AXISANGLE_DEGREES: 090
OD_AXISANGLE_DEGREES: 060
OD_K2POWER_DIOPTERS: 43.75
METHOD_AUTO_MANUAL: AUTO

## 2023-01-19 ASSESSMENT — SPHEQUIV_DERIVED
OS_SPHEQUIV: -0.625
OD_SPHEQUIV: -0.25

## 2023-01-19 ASSESSMENT — REFRACTION_AUTOREFRACTION
OD_SPHERE: 0.00
OS_CYLINDER: -1.25
OD_AXIS: 095
OD_CYLINDER: -0.50
OS_AXIS: 095
OS_SPHERE: 0.00

## 2023-01-19 ASSESSMENT — TONOMETRY
OD_IOP_MMHG: 14
OS_IOP_MMHG: 15

## 2023-01-19 ASSESSMENT — VISUAL ACUITY
OD_BCVA: 20/20
OS_BCVA: 20/30-1

## 2023-01-19 ASSESSMENT — CONFRONTATIONAL VISUAL FIELD TEST (CVF)
OD_FINDINGS: FULL
OS_FINDINGS: FULL

## 2023-01-19 ASSESSMENT — AXIALLENGTH_DERIVED
OS_AL: 23.6058
OD_AL: 23.6435

## 2023-04-13 ENCOUNTER — OFFICE (OUTPATIENT)
Dept: URBAN - METROPOLITAN AREA CLINIC 94 | Facility: CLINIC | Age: 73
Setting detail: OPHTHALMOLOGY
End: 2023-04-13
Payer: COMMERCIAL

## 2023-04-13 DIAGNOSIS — H35.3122: ICD-10-CM

## 2023-04-13 DIAGNOSIS — E11.3511: ICD-10-CM

## 2023-04-13 DIAGNOSIS — H34.8310: ICD-10-CM

## 2023-04-13 DIAGNOSIS — H43.812: ICD-10-CM

## 2023-04-13 DIAGNOSIS — H35.3213: ICD-10-CM

## 2023-04-13 DIAGNOSIS — E11.3292: ICD-10-CM

## 2023-04-13 PROCEDURE — 99024 POSTOP FOLLOW-UP VISIT: CPT | Performed by: SPECIALIST

## 2023-04-13 PROCEDURE — 92235 FLUORESCEIN ANGRPH MLTIFRAME: CPT | Performed by: SPECIALIST

## 2023-04-13 PROCEDURE — 92134 CPTRZ OPH DX IMG PST SGM RTA: CPT | Performed by: SPECIALIST

## 2023-04-13 ASSESSMENT — CONFRONTATIONAL VISUAL FIELD TEST (CVF)
OS_FINDINGS: FULL
OD_FINDINGS: FULL

## 2023-04-13 ASSESSMENT — REFRACTION_AUTOREFRACTION
OS_AXIS: 095
OD_AXIS: 095
OD_CYLINDER: -0.50
OS_CYLINDER: -1.25
OD_SPHERE: 0.00
OS_SPHERE: 0.00

## 2023-04-13 ASSESSMENT — KERATOMETRY
OS_AXISANGLE_DEGREES: 090
METHOD_AUTO_MANUAL: AUTO
OD_K1POWER_DIOPTERS: 43.50
OD_K2POWER_DIOPTERS: 43.75
OD_AXISANGLE_DEGREES: 060
OS_K1POWER_DIOPTERS: 44.00
OS_K2POWER_DIOPTERS: 44.25

## 2023-04-13 ASSESSMENT — SPHEQUIV_DERIVED
OD_SPHEQUIV: -0.25
OS_SPHEQUIV: -0.625

## 2023-04-13 ASSESSMENT — TONOMETRY
OD_IOP_MMHG: 11
OS_IOP_MMHG: 13

## 2023-04-13 ASSESSMENT — AXIALLENGTH_DERIVED
OD_AL: 23.6435
OS_AL: 23.6058

## 2023-04-13 ASSESSMENT — VISUAL ACUITY
OD_BCVA: 20/20
OS_BCVA: 20/30-1

## 2023-04-27 ENCOUNTER — OFFICE (OUTPATIENT)
Dept: URBAN - METROPOLITAN AREA CLINIC 94 | Facility: CLINIC | Age: 73
Setting detail: OPHTHALMOLOGY
End: 2023-04-27
Payer: COMMERCIAL

## 2023-04-27 DIAGNOSIS — H34.8310: ICD-10-CM

## 2023-04-27 DIAGNOSIS — H35.3122: ICD-10-CM

## 2023-04-27 DIAGNOSIS — E11.3292: ICD-10-CM

## 2023-04-27 DIAGNOSIS — E11.3511: ICD-10-CM

## 2023-04-27 DIAGNOSIS — H35.3213: ICD-10-CM

## 2023-04-27 PROCEDURE — 67028 INJECTION EYE DRUG: CPT | Performed by: SPECIALIST

## 2023-04-27 PROCEDURE — 92134 CPTRZ OPH DX IMG PST SGM RTA: CPT | Performed by: SPECIALIST

## 2023-04-27 ASSESSMENT — KERATOMETRY
METHOD_AUTO_MANUAL: AUTO
OS_AXISANGLE_DEGREES: 090
OD_AXISANGLE_DEGREES: 060
OD_K2POWER_DIOPTERS: 43.75
OS_K1POWER_DIOPTERS: 44.00
OS_K2POWER_DIOPTERS: 44.25
OD_K1POWER_DIOPTERS: 43.50

## 2023-04-27 ASSESSMENT — REFRACTION_AUTOREFRACTION
OD_AXIS: 095
OS_AXIS: 095
OD_CYLINDER: -0.50
OS_CYLINDER: -1.25
OD_SPHERE: 0.00
OS_SPHERE: 0.00

## 2023-04-27 ASSESSMENT — AXIALLENGTH_DERIVED
OS_AL: 23.6058
OD_AL: 23.6435

## 2023-04-27 ASSESSMENT — SPHEQUIV_DERIVED
OD_SPHEQUIV: -0.25
OS_SPHEQUIV: -0.625

## 2023-04-27 ASSESSMENT — VISUAL ACUITY
OD_BCVA: 20/20
OS_BCVA: 20/40+1

## 2023-04-27 ASSESSMENT — CONFRONTATIONAL VISUAL FIELD TEST (CVF)
OS_FINDINGS: FULL
OD_FINDINGS: FULL

## 2023-04-27 ASSESSMENT — TONOMETRY
OD_IOP_MMHG: 13
OS_IOP_MMHG: 14

## 2023-05-11 ENCOUNTER — OFFICE (OUTPATIENT)
Dept: URBAN - METROPOLITAN AREA CLINIC 94 | Facility: CLINIC | Age: 73
Setting detail: OPHTHALMOLOGY
End: 2023-05-11
Payer: COMMERCIAL

## 2023-05-11 DIAGNOSIS — H34.8310: ICD-10-CM

## 2023-05-11 DIAGNOSIS — E11.3511: ICD-10-CM

## 2023-05-11 DIAGNOSIS — H43.812: ICD-10-CM

## 2023-05-11 DIAGNOSIS — H35.3213: ICD-10-CM

## 2023-05-11 DIAGNOSIS — H35.3122: ICD-10-CM

## 2023-05-11 DIAGNOSIS — E11.3292: ICD-10-CM

## 2023-05-11 PROCEDURE — 92134 CPTRZ OPH DX IMG PST SGM RTA: CPT | Performed by: SPECIALIST

## 2023-05-11 PROCEDURE — 92012 INTRM OPH EXAM EST PATIENT: CPT | Performed by: SPECIALIST

## 2023-05-11 ASSESSMENT — TONOMETRY
OD_IOP_MMHG: 13
OS_IOP_MMHG: 15

## 2023-05-11 ASSESSMENT — AXIALLENGTH_DERIVED
OS_AL: 23.6058
OD_AL: 23.6435

## 2023-05-11 ASSESSMENT — KERATOMETRY
OD_K1POWER_DIOPTERS: 43.50
OS_K2POWER_DIOPTERS: 44.25
METHOD_AUTO_MANUAL: AUTO
OS_K1POWER_DIOPTERS: 44.00
OS_AXISANGLE_DEGREES: 090
OD_AXISANGLE_DEGREES: 060
OD_K2POWER_DIOPTERS: 43.75

## 2023-05-11 ASSESSMENT — REFRACTION_AUTOREFRACTION
OS_SPHERE: 0.00
OS_AXIS: 095
OS_CYLINDER: -1.25
OD_AXIS: 095
OD_SPHERE: 0.00
OD_CYLINDER: -0.50

## 2023-05-11 ASSESSMENT — VISUAL ACUITY
OS_BCVA: 20/50+1
OD_BCVA: 20/20-1

## 2023-05-11 ASSESSMENT — CONFRONTATIONAL VISUAL FIELD TEST (CVF)
OS_FINDINGS: FULL
OD_FINDINGS: FULL

## 2023-05-11 ASSESSMENT — SPHEQUIV_DERIVED
OS_SPHEQUIV: -0.625
OD_SPHEQUIV: -0.25

## 2023-05-17 ENCOUNTER — OFFICE (OUTPATIENT)
Dept: URBAN - METROPOLITAN AREA CLINIC 63 | Facility: CLINIC | Age: 73
Setting detail: OPHTHALMOLOGY
End: 2023-05-17
Payer: MEDICARE

## 2023-05-17 DIAGNOSIS — E11.3511: ICD-10-CM

## 2023-05-17 PROCEDURE — 67210 TREATMENT OF RETINAL LESION: CPT | Performed by: SPECIALIST

## 2023-05-17 ASSESSMENT — AXIALLENGTH_DERIVED
OS_AL: 23.6058
OD_AL: 23.6435

## 2023-05-17 ASSESSMENT — REFRACTION_AUTOREFRACTION
OS_CYLINDER: -1.25
OS_SPHERE: 0.00
OD_AXIS: 095
OD_CYLINDER: -0.50
OD_SPHERE: 0.00
OS_AXIS: 095

## 2023-05-17 ASSESSMENT — TONOMETRY
OS_IOP_MMHG: 16
OD_IOP_MMHG: 14

## 2023-05-17 ASSESSMENT — KERATOMETRY
OD_AXISANGLE_DEGREES: 060
OS_K2POWER_DIOPTERS: 44.25
OS_AXISANGLE_DEGREES: 090
METHOD_AUTO_MANUAL: AUTO
OS_K1POWER_DIOPTERS: 44.00
OD_K1POWER_DIOPTERS: 43.50
OD_K2POWER_DIOPTERS: 43.75

## 2023-05-17 ASSESSMENT — VISUAL ACUITY
OS_BCVA: 20/60
OD_BCVA: 20/20-1

## 2023-05-17 ASSESSMENT — SPHEQUIV_DERIVED
OD_SPHEQUIV: -0.25
OS_SPHEQUIV: -0.625

## 2023-05-17 ASSESSMENT — CONFRONTATIONAL VISUAL FIELD TEST (CVF)
OD_FINDINGS: FULL
OS_FINDINGS: FULL

## 2023-06-15 ENCOUNTER — OFFICE (OUTPATIENT)
Dept: URBAN - METROPOLITAN AREA CLINIC 94 | Facility: CLINIC | Age: 73
Setting detail: OPHTHALMOLOGY
End: 2023-06-15
Payer: MEDICARE

## 2023-06-15 DIAGNOSIS — E11.3511: ICD-10-CM

## 2023-06-15 PROCEDURE — 92134 CPTRZ OPH DX IMG PST SGM RTA: CPT | Performed by: SPECIALIST

## 2023-06-15 PROCEDURE — 67028 INJECTION EYE DRUG: CPT | Performed by: SPECIALIST

## 2023-06-15 ASSESSMENT — TONOMETRY
OS_IOP_MMHG: 14
OD_IOP_MMHG: 14

## 2023-06-15 ASSESSMENT — CONFRONTATIONAL VISUAL FIELD TEST (CVF)
OS_FINDINGS: FULL
OD_FINDINGS: FULL

## 2023-06-20 ASSESSMENT — SPHEQUIV_DERIVED
OD_SPHEQUIV: -0.25
OS_SPHEQUIV: -0.625

## 2023-06-20 ASSESSMENT — KERATOMETRY
OS_K2POWER_DIOPTERS: 44.25
OD_AXISANGLE_DEGREES: 060
OS_K1POWER_DIOPTERS: 44.00
OS_AXISANGLE_DEGREES: 090
METHOD_AUTO_MANUAL: AUTO
OD_K2POWER_DIOPTERS: 43.75
OD_K1POWER_DIOPTERS: 43.50

## 2023-06-20 ASSESSMENT — REFRACTION_AUTOREFRACTION
OD_SPHERE: 0.00
OS_SPHERE: 0.00
OS_CYLINDER: -1.25
OD_CYLINDER: -0.50
OD_AXIS: 095
OS_AXIS: 095

## 2023-06-20 ASSESSMENT — AXIALLENGTH_DERIVED
OD_AL: 23.6435
OS_AL: 23.6058

## 2023-06-20 ASSESSMENT — VISUAL ACUITY
OS_BCVA: 20/60-1
OD_BCVA: 20/20-1

## 2023-07-17 ENCOUNTER — APPOINTMENT (OUTPATIENT)
Dept: PULMONOLOGY | Facility: CLINIC | Age: 73
End: 2023-07-17
Payer: MEDICARE

## 2023-07-17 VITALS
HEIGHT: 64 IN | DIASTOLIC BLOOD PRESSURE: 60 MMHG | HEART RATE: 74 BPM | OXYGEN SATURATION: 98 % | SYSTOLIC BLOOD PRESSURE: 118 MMHG | RESPIRATION RATE: 16 BRPM | BODY MASS INDEX: 21.34 KG/M2 | WEIGHT: 125 LBS

## 2023-07-17 PROCEDURE — 99214 OFFICE O/P EST MOD 30 MIN: CPT

## 2023-07-17 NOTE — HISTORY OF PRESENT ILLNESS
[TextBox_4] : Patient with good response to inspire.  Has been using it every night recently though with some error codes from the remote control.  It turns out she has been using the wrong batteries which are not giving enough power.  She was reinstructed in usage.  Interrogation to be done by the technician today.  Patient has lost about 25 pounds.  She apparently needs more voltage to activate the device and she was reprogrammed up slightly.  Unclear if she still needs the device.  A repeat home sleep study will be done without using the device.f/u 6 weeks.

## 2023-07-20 ENCOUNTER — OFFICE (OUTPATIENT)
Dept: URBAN - METROPOLITAN AREA CLINIC 94 | Facility: CLINIC | Age: 73
Setting detail: OPHTHALMOLOGY
End: 2023-07-20
Payer: MEDICARE

## 2023-07-20 DIAGNOSIS — H34.8310: ICD-10-CM

## 2023-07-20 DIAGNOSIS — H35.3122: ICD-10-CM

## 2023-07-20 DIAGNOSIS — H35.3213: ICD-10-CM

## 2023-07-20 DIAGNOSIS — E11.3312: ICD-10-CM

## 2023-07-20 DIAGNOSIS — E11.3511: ICD-10-CM

## 2023-07-20 DIAGNOSIS — H43.812: ICD-10-CM

## 2023-07-20 PROCEDURE — 67210 TREATMENT OF RETINAL LESION: CPT | Performed by: SPECIALIST

## 2023-07-20 PROCEDURE — 92134 CPTRZ OPH DX IMG PST SGM RTA: CPT | Performed by: SPECIALIST

## 2023-07-20 ASSESSMENT — VISUAL ACUITY
OS_BCVA: 20/40-1
OD_BCVA: 20/20-1

## 2023-07-20 ASSESSMENT — REFRACTION_AUTOREFRACTION
OS_AXIS: 095
OS_CYLINDER: -1.25
OD_AXIS: 095
OD_CYLINDER: -0.50
OS_SPHERE: 0.00
OD_SPHERE: 0.00

## 2023-07-20 ASSESSMENT — KERATOMETRY
OS_K1POWER_DIOPTERS: 44.00
METHOD_AUTO_MANUAL: AUTO
OD_AXISANGLE_DEGREES: 060
OD_K2POWER_DIOPTERS: 43.75
OS_K2POWER_DIOPTERS: 44.25
OS_AXISANGLE_DEGREES: 090
OD_K1POWER_DIOPTERS: 43.50

## 2023-07-20 ASSESSMENT — CONFRONTATIONAL VISUAL FIELD TEST (CVF)
OS_FINDINGS: FULL
OD_FINDINGS: FULL

## 2023-07-20 ASSESSMENT — AXIALLENGTH_DERIVED
OD_AL: 23.6435
OS_AL: 23.6058

## 2023-07-20 ASSESSMENT — TONOMETRY
OD_IOP_MMHG: 14
OS_IOP_MMHG: 17

## 2023-07-20 ASSESSMENT — SPHEQUIV_DERIVED
OS_SPHEQUIV: -0.625
OD_SPHEQUIV: -0.25

## 2023-08-02 ENCOUNTER — OUTPATIENT (OUTPATIENT)
Dept: OUTPATIENT SERVICES | Facility: HOSPITAL | Age: 73
LOS: 1 days | End: 2023-08-02
Payer: MEDICARE
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
## 2023-08-02 DIAGNOSIS — Z98.49 CATARACT EXTRACTION STATUS, UNSPECIFIED EYE: Chronic | ICD-10-CM

## 2023-08-02 DIAGNOSIS — G47.33 OBSTRUCTIVE SLEEP APNEA (ADULT) (PEDIATRIC): ICD-10-CM

## 2023-08-02 DIAGNOSIS — Z98.890 OTHER SPECIFIED POSTPROCEDURAL STATES: Chronic | ICD-10-CM

## 2023-08-02 DIAGNOSIS — Z98.82 BREAST IMPLANT STATUS: Chronic | ICD-10-CM

## 2023-08-02 PROCEDURE — 95806 SLEEP STUDY UNATT&RESP EFFT: CPT

## 2023-08-02 PROCEDURE — 95806 SLEEP STUDY UNATT&RESP EFFT: CPT | Mod: 26

## 2023-08-15 ENCOUNTER — APPOINTMENT (OUTPATIENT)
Dept: PULMONOLOGY | Facility: CLINIC | Age: 73
End: 2023-08-15

## 2023-08-24 ENCOUNTER — OFFICE (OUTPATIENT)
Dept: URBAN - METROPOLITAN AREA CLINIC 94 | Facility: CLINIC | Age: 73
Setting detail: OPHTHALMOLOGY
End: 2023-08-24
Payer: MEDICARE

## 2023-08-24 DIAGNOSIS — E11.3511: ICD-10-CM

## 2023-08-24 PROCEDURE — 67210 TREATMENT OF RETINAL LESION: CPT | Performed by: SPECIALIST

## 2023-08-24 ASSESSMENT — SPHEQUIV_DERIVED
OD_SPHEQUIV: -0.25
OS_SPHEQUIV: -0.625

## 2023-08-24 ASSESSMENT — REFRACTION_AUTOREFRACTION
OS_SPHERE: 0.00
OD_SPHERE: 0.00
OS_CYLINDER: -1.25
OD_CYLINDER: -0.50
OD_AXIS: 095
OS_AXIS: 095

## 2023-08-24 ASSESSMENT — KERATOMETRY
OS_K1POWER_DIOPTERS: 44.00
METHOD_AUTO_MANUAL: AUTO
OS_K2POWER_DIOPTERS: 44.25
OD_K2POWER_DIOPTERS: 43.75
OS_AXISANGLE_DEGREES: 090
OD_AXISANGLE_DEGREES: 060
OD_K1POWER_DIOPTERS: 43.50

## 2023-08-24 ASSESSMENT — CONFRONTATIONAL VISUAL FIELD TEST (CVF)
OD_FINDINGS: FULL
OS_FINDINGS: FULL

## 2023-08-24 ASSESSMENT — VISUAL ACUITY
OS_BCVA: 20/40-1
OD_BCVA: 20/20-1

## 2023-08-24 ASSESSMENT — AXIALLENGTH_DERIVED
OS_AL: 23.6058
OD_AL: 23.6435

## 2023-09-07 ENCOUNTER — OFFICE (OUTPATIENT)
Dept: URBAN - METROPOLITAN AREA CLINIC 94 | Facility: CLINIC | Age: 73
Setting detail: OPHTHALMOLOGY
End: 2023-09-07
Payer: MEDICARE

## 2023-09-07 DIAGNOSIS — E11.3511: ICD-10-CM

## 2023-09-07 DIAGNOSIS — E11.3312: ICD-10-CM

## 2023-09-07 DIAGNOSIS — H35.3213: ICD-10-CM

## 2023-09-07 DIAGNOSIS — H35.3122: ICD-10-CM

## 2023-09-07 DIAGNOSIS — H34.8310: ICD-10-CM

## 2023-09-07 DIAGNOSIS — H43.812: ICD-10-CM

## 2023-09-07 PROCEDURE — 92134 CPTRZ OPH DX IMG PST SGM RTA: CPT | Performed by: SPECIALIST

## 2023-09-07 PROCEDURE — 67028 INJECTION EYE DRUG: CPT | Performed by: SPECIALIST

## 2023-09-07 ASSESSMENT — TONOMETRY
OS_IOP_MMHG: 17
OD_IOP_MMHG: 17

## 2023-09-07 ASSESSMENT — REFRACTION_AUTOREFRACTION
OD_SPHERE: 0.00
OS_SPHERE: 0.00
OS_CYLINDER: -1.25
OS_AXIS: 095
OD_CYLINDER: -0.50
OD_AXIS: 095

## 2023-09-07 ASSESSMENT — AXIALLENGTH_DERIVED
OD_AL: 23.6435
OS_AL: 23.6058

## 2023-09-07 ASSESSMENT — KERATOMETRY
METHOD_AUTO_MANUAL: AUTO
OS_AXISANGLE_DEGREES: 090
OD_K2POWER_DIOPTERS: 43.75
OD_AXISANGLE_DEGREES: 060
OS_K1POWER_DIOPTERS: 44.00
OD_K1POWER_DIOPTERS: 43.50
OS_K2POWER_DIOPTERS: 44.25

## 2023-09-07 ASSESSMENT — SPHEQUIV_DERIVED
OD_SPHEQUIV: -0.25
OS_SPHEQUIV: -0.625

## 2023-09-07 ASSESSMENT — VISUAL ACUITY
OS_BCVA: 20/40-2
OD_BCVA: 20/25-1

## 2023-09-07 ASSESSMENT — CONFRONTATIONAL VISUAL FIELD TEST (CVF)
OS_FINDINGS: FULL
OD_FINDINGS: FULL

## 2023-09-28 ENCOUNTER — OFFICE (OUTPATIENT)
Dept: URBAN - METROPOLITAN AREA CLINIC 94 | Facility: CLINIC | Age: 73
Setting detail: OPHTHALMOLOGY
End: 2023-09-28
Payer: MEDICARE

## 2023-09-28 DIAGNOSIS — H35.3213: ICD-10-CM

## 2023-09-28 DIAGNOSIS — H35.3122: ICD-10-CM

## 2023-09-28 DIAGNOSIS — H43.812: ICD-10-CM

## 2023-09-28 DIAGNOSIS — H34.8310: ICD-10-CM

## 2023-09-28 PROCEDURE — 99024 POSTOP FOLLOW-UP VISIT: CPT | Performed by: SPECIALIST

## 2023-09-28 PROCEDURE — 92134 CPTRZ OPH DX IMG PST SGM RTA: CPT | Performed by: SPECIALIST

## 2023-09-28 ASSESSMENT — REFRACTION_AUTOREFRACTION
OS_CYLINDER: -1.25
OD_SPHERE: 0.00
OS_SPHERE: 0.00
OD_CYLINDER: -0.50
OS_AXIS: 095
OD_AXIS: 095

## 2023-09-28 ASSESSMENT — KERATOMETRY
OS_K1POWER_DIOPTERS: 44.00
OD_AXISANGLE_DEGREES: 060
METHOD_AUTO_MANUAL: AUTO
OD_K1POWER_DIOPTERS: 43.50
OS_AXISANGLE_DEGREES: 090
OS_K2POWER_DIOPTERS: 44.25
OD_K2POWER_DIOPTERS: 43.75

## 2023-09-28 ASSESSMENT — AXIALLENGTH_DERIVED
OS_AL: 23.6058
OD_AL: 23.6435

## 2023-09-28 ASSESSMENT — CONFRONTATIONAL VISUAL FIELD TEST (CVF)
OS_FINDINGS: FULL
OD_FINDINGS: FULL

## 2023-09-28 ASSESSMENT — VISUAL ACUITY
OS_BCVA: 20/40-1
OD_BCVA: 20/25+1

## 2023-09-28 ASSESSMENT — TONOMETRY
OS_IOP_MMHG: 16
OD_IOP_MMHG: 16

## 2023-09-28 ASSESSMENT — SPHEQUIV_DERIVED
OD_SPHEQUIV: -0.25
OS_SPHEQUIV: -0.625

## 2023-11-09 ENCOUNTER — OFFICE (OUTPATIENT)
Dept: URBAN - METROPOLITAN AREA CLINIC 94 | Facility: CLINIC | Age: 73
Setting detail: OPHTHALMOLOGY
End: 2023-11-09
Payer: MEDICARE

## 2023-11-09 DIAGNOSIS — E11.3313: ICD-10-CM

## 2023-11-09 DIAGNOSIS — H35.3122: ICD-10-CM

## 2023-11-09 DIAGNOSIS — H35.3213: ICD-10-CM

## 2023-11-09 DIAGNOSIS — H34.8310: ICD-10-CM

## 2023-11-09 DIAGNOSIS — H43.812: ICD-10-CM

## 2023-11-09 PROCEDURE — 99024 POSTOP FOLLOW-UP VISIT: CPT | Performed by: SPECIALIST

## 2023-11-09 PROCEDURE — 92134 CPTRZ OPH DX IMG PST SGM RTA: CPT | Performed by: SPECIALIST

## 2023-11-09 ASSESSMENT — CONFRONTATIONAL VISUAL FIELD TEST (CVF)
OS_FINDINGS: FULL
OD_FINDINGS: FULL

## 2023-11-09 ASSESSMENT — REFRACTION_AUTOREFRACTION
OD_CYLINDER: -0.50
OS_CYLINDER: -1.25
OS_SPHERE: 0.00
OD_SPHERE: 0.00
OD_AXIS: 095
OS_AXIS: 095

## 2023-11-09 ASSESSMENT — SPHEQUIV_DERIVED
OS_SPHEQUIV: -0.625
OD_SPHEQUIV: -0.25

## 2023-11-27 ENCOUNTER — OFFICE (OUTPATIENT)
Dept: URBAN - METROPOLITAN AREA CLINIC 94 | Facility: CLINIC | Age: 73
Setting detail: OPHTHALMOLOGY
End: 2023-11-27
Payer: MEDICARE

## 2023-11-27 DIAGNOSIS — E11.3511: ICD-10-CM

## 2023-11-27 PROBLEM — E11.3312 DM TYPE 2; RIGHT PROLIFERATIVE WITH ME, LEFT MOD WITH ME: Status: ACTIVE | Noted: 2023-11-09

## 2023-11-27 PROBLEM — E11.3313 DM TYPE 2; RIGHT PROLIFERATIVE WITH ME, LEFT MOD WITH ME: Status: ACTIVE | Noted: 2023-11-09

## 2023-11-27 PROCEDURE — 67210 TREATMENT OF RETINAL LESION: CPT | Mod: RT | Performed by: SPECIALIST

## 2023-11-27 ASSESSMENT — REFRACTION_AUTOREFRACTION
OD_SPHERE: 0.00
OS_CYLINDER: -1.25
OD_AXIS: 095
OD_CYLINDER: -0.50
OS_SPHERE: 0.00
OS_AXIS: 095

## 2023-11-27 ASSESSMENT — SPHEQUIV_DERIVED
OD_SPHEQUIV: -0.25
OS_SPHEQUIV: -0.625

## 2023-11-27 ASSESSMENT — CONFRONTATIONAL VISUAL FIELD TEST (CVF)
OS_FINDINGS: FULL
OD_FINDINGS: FULL

## 2023-12-04 ENCOUNTER — ASC (OUTPATIENT)
Dept: URBAN - METROPOLITAN AREA SURGERY 8 | Facility: SURGERY | Age: 73
Setting detail: OPHTHALMOLOGY
End: 2023-12-04
Payer: MEDICARE

## 2023-12-04 DIAGNOSIS — E11.3313: ICD-10-CM

## 2023-12-04 DIAGNOSIS — E11.3511: ICD-10-CM

## 2023-12-04 DIAGNOSIS — E11.3312: ICD-10-CM

## 2023-12-04 PROCEDURE — 99024 POSTOP FOLLOW-UP VISIT: CPT | Performed by: SPECIALIST

## 2023-12-04 PROCEDURE — 67210 TREATMENT OF RETINAL LESION: CPT | Mod: 79,LT | Performed by: SPECIALIST

## 2023-12-04 ASSESSMENT — SPHEQUIV_DERIVED
OS_SPHEQUIV: -0.625
OD_SPHEQUIV: -0.25

## 2023-12-04 ASSESSMENT — REFRACTION_AUTOREFRACTION
OS_SPHERE: 0.00
OS_CYLINDER: -1.25
OD_AXIS: 095
OD_SPHERE: 0.00
OS_AXIS: 095
OD_CYLINDER: -0.50

## 2023-12-04 ASSESSMENT — CONFRONTATIONAL VISUAL FIELD TEST (CVF)
OS_FINDINGS: FULL
OD_FINDINGS: FULL

## 2023-12-18 ENCOUNTER — OFFICE (OUTPATIENT)
Dept: URBAN - METROPOLITAN AREA CLINIC 94 | Facility: CLINIC | Age: 73
Setting detail: OPHTHALMOLOGY
End: 2023-12-18
Payer: MEDICARE

## 2023-12-18 DIAGNOSIS — E11.3511: ICD-10-CM

## 2023-12-18 PROCEDURE — 99024 POSTOP FOLLOW-UP VISIT: CPT | Performed by: SPECIALIST

## 2023-12-18 PROCEDURE — 67028 INJECTION EYE DRUG: CPT | Mod: 58,RT | Performed by: SPECIALIST

## 2023-12-18 PROCEDURE — 92134 CPTRZ OPH DX IMG PST SGM RTA: CPT | Performed by: SPECIALIST

## 2023-12-18 ASSESSMENT — SPHEQUIV_DERIVED
OS_SPHEQUIV: -0.625
OD_SPHEQUIV: -0.25

## 2023-12-18 ASSESSMENT — REFRACTION_AUTOREFRACTION
OS_AXIS: 095
OD_SPHERE: 0.00
OD_AXIS: 095
OD_CYLINDER: -0.50
OS_CYLINDER: -1.25
OS_SPHERE: 0.00

## 2024-02-01 ENCOUNTER — OFFICE (OUTPATIENT)
Dept: URBAN - METROPOLITAN AREA CLINIC 94 | Facility: CLINIC | Age: 74
Setting detail: OPHTHALMOLOGY
End: 2024-02-01
Payer: MEDICARE

## 2024-02-01 DIAGNOSIS — E11.3511: ICD-10-CM

## 2024-02-01 DIAGNOSIS — H43.812: ICD-10-CM

## 2024-02-01 DIAGNOSIS — H35.3213: ICD-10-CM

## 2024-02-01 DIAGNOSIS — E11.3313: ICD-10-CM

## 2024-02-01 DIAGNOSIS — H34.8310: ICD-10-CM

## 2024-02-01 DIAGNOSIS — H35.3122: ICD-10-CM

## 2024-02-01 PROCEDURE — 92134 CPTRZ OPH DX IMG PST SGM RTA: CPT | Performed by: SPECIALIST

## 2024-02-01 PROCEDURE — 92235 FLUORESCEIN ANGRPH MLTIFRAME: CPT | Performed by: SPECIALIST

## 2024-02-01 PROCEDURE — 67228 TREATMENT X10SV RETINOPATHY: CPT | Mod: 79,RT | Performed by: SPECIALIST

## 2024-02-01 ASSESSMENT — CONFRONTATIONAL VISUAL FIELD TEST (CVF)
OD_FINDINGS: FULL
OS_FINDINGS: FULL

## 2024-02-01 ASSESSMENT — SPHEQUIV_DERIVED
OS_SPHEQUIV: -0.625
OD_SPHEQUIV: -0.25

## 2024-02-01 ASSESSMENT — REFRACTION_AUTOREFRACTION
OS_AXIS: 095
OS_CYLINDER: -1.25
OD_CYLINDER: -0.50
OD_SPHERE: 0.00
OS_SPHERE: 0.00
OD_AXIS: 095

## 2024-04-04 ENCOUNTER — OFFICE (OUTPATIENT)
Dept: URBAN - METROPOLITAN AREA CLINIC 94 | Facility: CLINIC | Age: 74
Setting detail: OPHTHALMOLOGY
End: 2024-04-04
Payer: MEDICARE

## 2024-04-04 DIAGNOSIS — E11.3511: ICD-10-CM

## 2024-04-04 DIAGNOSIS — E11.3313: ICD-10-CM

## 2024-04-04 DIAGNOSIS — H35.3213: ICD-10-CM

## 2024-04-04 DIAGNOSIS — H43.812: ICD-10-CM

## 2024-04-04 DIAGNOSIS — H34.8310: ICD-10-CM

## 2024-04-04 PROCEDURE — 92134 CPTRZ OPH DX IMG PST SGM RTA: CPT | Performed by: SPECIALIST

## 2024-04-04 PROCEDURE — 92014 COMPRE OPH EXAM EST PT 1/>: CPT | Mod: 57 | Performed by: SPECIALIST

## 2024-04-04 PROCEDURE — 67210 TREATMENT OF RETINAL LESION: CPT | Mod: RT | Performed by: SPECIALIST

## 2024-04-24 NOTE — H&P PST ADULT - ANESTHESIA, PREVIOUS REACTION, PROFILE
"Daily Note     Today's date: 2024  Patient name: Estephania Brown  : 1951  MRN: [id number]  Referring provider: Rosalino Purvis MD  Dx:   Encounter Diagnosis     ICD-10-CM    1. Plantar fasciitis  M72.2       2. Gait abnormality  R26.9       3. Balance problem  R26.89       4. Left foot pain  M79.672             Start Time: 1220  Stop Time: 1305  Total time in clinic (min): 45 minutes      Subjective: Patient reports ongoing soreness and pain in the arch of her L foot, and that the plantar fascitis is \"really acting up today.\"      Objective: See treatment diary below      Assessment: Tolerated treatment well. Patient continued to respond favorably to manual therapy and noted some improvements with initially noted tightness post treatment. Patient demonstrated a stable and steady stance while on an uneven surface during marches and step ups on sand dune and did not experience any significant episodes of instability or LOB throughout duration of exercise. Patient displayed proper form and had a good recall/understanding of all exercises in her program. Patient would benefit from continued PT to reduce symptoms, restore L foot/ankle mobility, stability, strength, and to maximize overall functional ability.    Plan: Continue per plan of care.      Precautions: HTN, OP, fall risk, CA hx, hx of L ankle surgery    POC expires Unit limit Auth Expiration date PT/OT + Visit Limit?   24 N/a 24 BOMN                             Visit/Unit Tracking  AUTH Status:  Date 2/7 2/21 2/28 3/13 3/20 3/25 4/2 4/10 4/17 4/24     12 visits approved Used 1 2 3 4 5 6 7 8 9 10      Remaining  11 10 9 8 7 6 5 4 3 2        POC exp 24    Manuals 2/7 2/21 2/28 3/13 3/20 3/25 4/2 4/10 4/17 4/24   L ankle TC PA and distraction and ST mobs DS Gr IV DS Gr IV DS Gr IV DS gr IV DS Gr IV DS Gr IV NJ Gr IV DS Gr IV DS Gr IV NJ Gr IV                STM PF DS DS DS DS DS DS NJ DS DS KR                             Neuro Re-Ed   " "          Sand dune mini march 3 min no UE close S 3 min no UE close S 3 min no UE close S 3 min no UE close S 3 min no UE close S 3 min no UE close S 3 min no UE close S 3 min no UE close S 3 min no UE close S 3 min no UE close S   SLS on airex with contralat hip abd,  GMB x30 ea light UE GMB x30 ea light UE GMB x30 ea light UE GMB x30 ea light UE GMB x30 ea light UE GMB x30 ea light UE GMB x30 ea light UE GMB x30 ea light UE GMB x30 ea light UE GMB x30 ea light UE   Sand dune step ups 1 min ea LE light UE close S 1 min ea LE light UE close S 1 min ea LE light UE close S 1 min ea LE light UE close S 1 min ea LE light UE close S 1 min ea LE light UE close S 1 min ea LE light UE close S 1 min ea LE light UE close S 1 min ea LE light UE close S 1 min ea LE light UE close S   assess                                       Ther Ex             DF MWM 8\" step PT overpress 2x15, with belt 8\" step PT overpress 2x15, with belt 8\" step PT overpress 2x15, with belt 8\" step PT overpress 2x15, with belt 8\" step PT overpress 2x15, with belt 8\" step PT overpress 2x15, with belt 8\" step PT overpress 2x15, with belt 8\" step PT overpress 2x15, with belt 8\" step PT overpress 2x15, 1 round with belt 8\" step PT overpress 2x15, 1 round with belt   VG for strength and ROM S/l L7 3x10 ea  S/l L7 3x10 ea  S/l L7 3x10 ea S/l L7 3x10 ea S/l L7 3x10 ea S/l L7 3x10 ea S/l L7 3x10 ea S/l L7 3x10 ea S/l L7 3x10 ea  S/l L7 3x10 ea    Slant  45\"x3 LLE only, standing calf stretch 45\"x3 LLE only, standing calf stretch 45\"x4 LLE only, standing calf stretch 45\"x3 LLE only, standing calf stretch 45\"x3 LLE only, standing calf stretch 45\"x4 LLE only, standing calf stretch 45\"x4 LLE only, standing calf stretch 45\"x4 LLE only, standing calf stretch 45\"x4 LLE only, standing calf stretch 45\"x4 LLE only, standing calf stretch   Sciatic n kourtney RLE                                       Ther Activity                                       Gait Training           "                             Modalities                                                     denies

## 2024-05-06 ENCOUNTER — EMERGENCY (EMERGENCY)
Facility: HOSPITAL | Age: 74
LOS: 1 days | Discharge: HOME | End: 2024-05-06
Attending: EMERGENCY MEDICINE
Payer: MEDICARE

## 2024-05-06 VITALS
DIASTOLIC BLOOD PRESSURE: 83 MMHG | RESPIRATION RATE: 18 BRPM | HEIGHT: 65 IN | HEART RATE: 67 BPM | TEMPERATURE: 98 F | SYSTOLIC BLOOD PRESSURE: 160 MMHG | WEIGHT: 130.07 LBS | OXYGEN SATURATION: 98 %

## 2024-05-06 VITALS
DIASTOLIC BLOOD PRESSURE: 78 MMHG | SYSTOLIC BLOOD PRESSURE: 124 MMHG | HEART RATE: 74 BPM | RESPIRATION RATE: 18 BRPM | OXYGEN SATURATION: 98 % | TEMPERATURE: 98 F

## 2024-05-06 DIAGNOSIS — R42 DIZZINESS AND GIDDINESS: ICD-10-CM

## 2024-05-06 DIAGNOSIS — Z98.890 OTHER SPECIFIED POSTPROCEDURAL STATES: Chronic | ICD-10-CM

## 2024-05-06 DIAGNOSIS — I10 ESSENTIAL (PRIMARY) HYPERTENSION: ICD-10-CM

## 2024-05-06 DIAGNOSIS — G47.33 OBSTRUCTIVE SLEEP APNEA (ADULT) (PEDIATRIC): ICD-10-CM
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
## 2024-05-06 DIAGNOSIS — Z98.49 CATARACT EXTRACTION STATUS, UNSPECIFIED EYE: Chronic | ICD-10-CM

## 2024-05-06 DIAGNOSIS — Z98.82 BREAST IMPLANT STATUS: Chronic | ICD-10-CM

## 2024-05-06 LAB
ALBUMIN SERPL ELPH-MCNC: 4.1 G/DL — SIGNIFICANT CHANGE UP (ref 3.3–5.2)
ALP SERPL-CCNC: 49 U/L — SIGNIFICANT CHANGE UP (ref 40–120)
ALT FLD-CCNC: 28 U/L — SIGNIFICANT CHANGE UP
AMPHET UR-MCNC: NEGATIVE — SIGNIFICANT CHANGE UP
ANION GAP SERPL CALC-SCNC: 16 MMOL/L — SIGNIFICANT CHANGE UP (ref 5–17)
APPEARANCE UR: CLEAR — SIGNIFICANT CHANGE UP
APTT BLD: 27 SEC — SIGNIFICANT CHANGE UP (ref 24.5–35.6)
AST SERPL-CCNC: 22 U/L — SIGNIFICANT CHANGE UP
BARBITURATES UR SCN-MCNC: NEGATIVE — SIGNIFICANT CHANGE UP
BASOPHILS # BLD AUTO: 0.03 K/UL — SIGNIFICANT CHANGE UP (ref 0–0.2)
BASOPHILS NFR BLD AUTO: 0.5 % — SIGNIFICANT CHANGE UP (ref 0–2)
BENZODIAZ UR-MCNC: NEGATIVE — SIGNIFICANT CHANGE UP
BILIRUB SERPL-MCNC: 0.4 MG/DL — SIGNIFICANT CHANGE UP (ref 0.4–2)
BILIRUB UR-MCNC: NEGATIVE — SIGNIFICANT CHANGE UP
BUN SERPL-MCNC: 14.2 MG/DL — SIGNIFICANT CHANGE UP (ref 8–20)
CALCIUM SERPL-MCNC: 9.5 MG/DL — SIGNIFICANT CHANGE UP (ref 8.4–10.5)
CHLORIDE SERPL-SCNC: 99 MMOL/L — SIGNIFICANT CHANGE UP (ref 96–108)
CO2 SERPL-SCNC: 23 MMOL/L — SIGNIFICANT CHANGE UP (ref 22–29)
COCAINE METAB.OTHER UR-MCNC: NEGATIVE — SIGNIFICANT CHANGE UP
COLOR SPEC: YELLOW — SIGNIFICANT CHANGE UP
CREAT SERPL-MCNC: 0.52 MG/DL — SIGNIFICANT CHANGE UP (ref 0.5–1.3)
DIFF PNL FLD: NEGATIVE — SIGNIFICANT CHANGE UP
EGFR: 97 ML/MIN/1.73M2 — SIGNIFICANT CHANGE UP
EOSINOPHIL # BLD AUTO: 0.04 K/UL — SIGNIFICANT CHANGE UP (ref 0–0.5)
EOSINOPHIL NFR BLD AUTO: 0.7 % — SIGNIFICANT CHANGE UP (ref 0–6)
ETHANOL SERPL-MCNC: <10 MG/DL — SIGNIFICANT CHANGE UP (ref 0–9)
GLUCOSE SERPL-MCNC: 116 MG/DL — HIGH (ref 70–99)
GLUCOSE UR QL: NEGATIVE MG/DL — SIGNIFICANT CHANGE UP
HCT VFR BLD CALC: 42.8 % — SIGNIFICANT CHANGE UP (ref 34.5–45)
HGB BLD-MCNC: 15.2 G/DL — SIGNIFICANT CHANGE UP (ref 11.5–15.5)
IMM GRANULOCYTES NFR BLD AUTO: 0.4 % — SIGNIFICANT CHANGE UP (ref 0–0.9)
INR BLD: 0.91 RATIO — SIGNIFICANT CHANGE UP (ref 0.85–1.18)
KETONES UR-MCNC: NEGATIVE MG/DL — SIGNIFICANT CHANGE UP
LEUKOCYTE ESTERASE UR-ACNC: NEGATIVE — SIGNIFICANT CHANGE UP
LYMPHOCYTES # BLD AUTO: 0.95 K/UL — LOW (ref 1–3.3)
LYMPHOCYTES # BLD AUTO: 16.8 % — SIGNIFICANT CHANGE UP (ref 13–44)
MCHC RBC-ENTMCNC: 32.5 PG — SIGNIFICANT CHANGE UP (ref 27–34)
MCHC RBC-ENTMCNC: 35.5 GM/DL — SIGNIFICANT CHANGE UP (ref 32–36)
MCV RBC AUTO: 91.5 FL — SIGNIFICANT CHANGE UP (ref 80–100)
METHADONE UR-MCNC: NEGATIVE — SIGNIFICANT CHANGE UP
MONOCYTES # BLD AUTO: 0.37 K/UL — SIGNIFICANT CHANGE UP (ref 0–0.9)
MONOCYTES NFR BLD AUTO: 6.5 % — SIGNIFICANT CHANGE UP (ref 2–14)
NEUTROPHILS # BLD AUTO: 4.25 K/UL — SIGNIFICANT CHANGE UP (ref 1.8–7.4)
NEUTROPHILS NFR BLD AUTO: 75.1 % — SIGNIFICANT CHANGE UP (ref 43–77)
NITRITE UR-MCNC: NEGATIVE — SIGNIFICANT CHANGE UP
OPIATES UR-MCNC: NEGATIVE — SIGNIFICANT CHANGE UP
PCP SPEC-MCNC: SIGNIFICANT CHANGE UP
PCP UR-MCNC: NEGATIVE — SIGNIFICANT CHANGE UP
PH UR: 8.5 (ref 5–8)
PLATELET # BLD AUTO: 104 K/UL — LOW (ref 150–400)
POTASSIUM SERPL-MCNC: 4 MMOL/L — SIGNIFICANT CHANGE UP (ref 3.5–5.3)
POTASSIUM SERPL-SCNC: 4 MMOL/L — SIGNIFICANT CHANGE UP (ref 3.5–5.3)
PROT SERPL-MCNC: 7.4 G/DL — SIGNIFICANT CHANGE UP (ref 6.6–8.7)
PROT UR-MCNC: NEGATIVE MG/DL — SIGNIFICANT CHANGE UP
PROTHROM AB SERPL-ACNC: 10.1 SEC — SIGNIFICANT CHANGE UP (ref 9.5–13)
RBC # BLD: 4.68 M/UL — SIGNIFICANT CHANGE UP (ref 3.8–5.2)
RBC # FLD: 12.1 % — SIGNIFICANT CHANGE UP (ref 10.3–14.5)
SODIUM SERPL-SCNC: 137 MMOL/L — SIGNIFICANT CHANGE UP (ref 135–145)
SP GR SPEC: >1.03 — HIGH (ref 1–1.03)
THC UR QL: POSITIVE
UROBILINOGEN FLD QL: 0.2 MG/DL — SIGNIFICANT CHANGE UP (ref 0.2–1)
WBC # BLD: 5.66 K/UL — SIGNIFICANT CHANGE UP (ref 3.8–10.5)
WBC # FLD AUTO: 5.66 K/UL — SIGNIFICANT CHANGE UP (ref 3.8–10.5)

## 2024-05-06 PROCEDURE — 99285 EMERGENCY DEPT VISIT HI MDM: CPT | Mod: 25

## 2024-05-06 PROCEDURE — 99285 EMERGENCY DEPT VISIT HI MDM: CPT

## 2024-05-06 PROCEDURE — 36415 COLL VENOUS BLD VENIPUNCTURE: CPT

## 2024-05-06 PROCEDURE — 93010 ELECTROCARDIOGRAM REPORT: CPT

## 2024-05-06 PROCEDURE — 70498 CT ANGIOGRAPHY NECK: CPT | Mod: MC

## 2024-05-06 PROCEDURE — 93005 ELECTROCARDIOGRAM TRACING: CPT

## 2024-05-06 PROCEDURE — 0042T: CPT

## 2024-05-06 PROCEDURE — 70496 CT ANGIOGRAPHY HEAD: CPT | Mod: 26,MC

## 2024-05-06 PROCEDURE — 70450 CT HEAD/BRAIN W/O DYE: CPT | Mod: 26,MC,59

## 2024-05-06 PROCEDURE — 82962 GLUCOSE BLOOD TEST: CPT

## 2024-05-06 PROCEDURE — 85610 PROTHROMBIN TIME: CPT

## 2024-05-06 PROCEDURE — 85730 THROMBOPLASTIN TIME PARTIAL: CPT

## 2024-05-06 PROCEDURE — 70450 CT HEAD/BRAIN W/O DYE: CPT | Mod: MC

## 2024-05-06 PROCEDURE — 70498 CT ANGIOGRAPHY NECK: CPT | Mod: 26,MC

## 2024-05-06 PROCEDURE — 80307 DRUG TEST PRSMV CHEM ANLYZR: CPT

## 2024-05-06 PROCEDURE — T1013: CPT

## 2024-05-06 PROCEDURE — 70496 CT ANGIOGRAPHY HEAD: CPT | Mod: MC

## 2024-05-06 PROCEDURE — 80053 COMPREHEN METABOLIC PANEL: CPT

## 2024-05-06 PROCEDURE — 81003 URINALYSIS AUTO W/O SCOPE: CPT

## 2024-05-06 PROCEDURE — 85025 COMPLETE CBC W/AUTO DIFF WBC: CPT

## 2024-05-06 RX ORDER — SODIUM CHLORIDE 9 MG/ML
1000 INJECTION, SOLUTION INTRAVENOUS ONCE
Refills: 0 | Status: COMPLETED | OUTPATIENT
Start: 2024-05-06 | End: 2024-05-06

## 2024-05-06 RX ORDER — MECLIZINE HCL 12.5 MG
25 TABLET ORAL ONCE
Refills: 0 | Status: COMPLETED | OUTPATIENT
Start: 2024-05-06 | End: 2024-05-06

## 2024-05-06 RX ADMIN — SODIUM CHLORIDE 1000 MILLILITER(S): 9 INJECTION, SOLUTION INTRAVENOUS at 11:51

## 2024-05-06 NOTE — ED ADULT NURSE NOTE - OBJECTIVE STATEMENT
Pt c/o multiple medical complaints onset last night 2 am. Pt c/o dizziness, forgetfulness, neck and headache, visual disturbances and generalized weakness. Pt states she woke up feeling "dizzy" like the room was spinning and saw a man, boy, and colors around room. Pt has trouble getting out of bed and cannot take a step without feeling dizzy. Pt hx of prediabetes and HTN. Pt reports weakness and dizziness at this time. Pt is a&ox4, NAD.

## 2024-05-06 NOTE — ED PROVIDER NOTE - PATIENT PORTAL LINK FT
You can access the FollowMyHealth Patient Portal offered by [facility] by registering at the following website: [email or web address]. By joining [employer]’s FollowMyHealth portal, you will also be able to view your health information using other applications (apps) compatible with our system.

## 2024-05-06 NOTE — ED ADULT TRIAGE NOTE - CHIEF COMPLAINT QUOTE
pt c/o dizziness, dry mouth, chills, generalized weakness and malaise that started last night, pt moving all extems without difficulty,  resp even and unlabored no distress noted, denies headache, visual changes, numbness/tingling

## 2024-05-06 NOTE — ED PROVIDER NOTE - PHYSICAL EXAMINATION
Vitals: mild htn  Gen: Anxious appearing  Head: NCAT    ENT: EOMI. No exudates  CV: RRR. Audible S1 and S2. No murmurs. 2+ radial and DP pulses   Pulm: Clear to auscultation bilaterally. No wheezes, rales, or rhonchi  Abd: soft, NTND, no rebound, no guarding  Musculoskeletal:  No peripheral edema, no calf ttp  Neurologic: AAOx3, No ataxia, no facial asymmetry, symmetric strength, unsteady gait  : no CVA tenderness

## 2024-05-06 NOTE — ED PROVIDER NOTE - CLINICAL SUMMARY MEDICAL DECISION MAKING FREE TEXT BOX
Patient evaluated for etiology of her dizziness, laboratory studies reviewed and grossly unremarkable, she does have persistent symptoms which are odd in presentation however given negative CT imaging. Of note she did have a urine tox that was positive for THC which can also explain her symptoms. Patient evaluated for etiology of her dizziness, laboratory studies reviewed and grossly unremarkable, she does have persistent symptoms which are odd in presentation given negative CT imaging. Of note she did have a urine tox that was positive for THC which can also explain her symptoms. She began to mention coexisting symptoms of dry mouth and hallucination with her dizziness.  Upon further questioning she did mention that she did ingest a THC gummy that her granddaughter gave her last night before her symptoms started.  Upon reevaluation emergency department after IV fluids and time she did have symptomatic improvement and feels better, I do believe she will continue to improve.  She is noted to be ambulatory without assistance in ED, stable for discharge home and follow-up with her PMD. Twelve-lead EKG showing normal sinus rhythm rate of 68, left axis deviation, no signs of acute ischemia or dysrhythmia, normal intervals.  Patient evaluated for etiology of her dizziness, laboratory studies reviewed and grossly unremarkable, she does have persistent symptoms which are odd in presentation given negative CT imaging. Of note she did have a urine tox that was positive for THC which can also explain her symptoms. She began to mention coexisting symptoms of dry mouth and hallucination with her dizziness.  Upon further questioning she did mention that she did ingest a THC gummy that her granddaughter gave her last night before her symptoms started.  Upon reevaluation emergency department after IV fluids and time she did have symptomatic improvement and feels better, I do believe she will continue to improve.  She is noted to be ambulatory without assistance in ED, stable for discharge home and follow-up with her PMD.

## 2024-05-06 NOTE — ED PROVIDER NOTE - OBJECTIVE STATEMENT
73 F history hypertension, JERE, prediabetes presenting with acute onset dizziness approximately 8 hours prior to my evaluation.  Patient states that she has had intermittent dizziness described as room spinning, no nausea or vomiting, no fevers or chills.  She does describe headache and vague feeling of neck tingling, denies any focal weakness however states that she is off balance and unable to walk without assistance however her strength is good.  Denies any chest or back pain or abdominal pain.  She also mentions that she has been hallucinating last night seeing people that were not there and seeing flashes of lights.  Denies any drug use or history of psychiatric medical problems.

## 2024-05-06 NOTE — ED ADULT NURSE NOTE - NSFALLRISKINTERV_ED_ALL_ED

## 2024-05-06 NOTE — ED PROVIDER NOTE - NSCAREINITIATED _GEN_ER
Adarsh Quiroz(Attending) Island Pedicle Flap With Canthal Suspension Text: The defect edges were debeveled with a #15 scalpel blade.  Given the location of the defect, shape of the defect and the proximity to free margins an island pedicle advancement flap was deemed most appropriate.  Using a sterile surgical marker, an appropriate advancement flap was drawn incorporating the defect, outlining the appropriate donor tissue and placing the expected incisions within the relaxed skin tension lines where possible. The area thus outlined was incised deep to adipose tissue with a #15 scalpel blade.  The skin margins were undermined to an appropriate distance in all directions around the primary defect and laterally outward around the island pedicle utilizing iris scissors.  There was minimal undermining beneath the pedicle flap. A suspension suture was placed in the canthal tendon to prevent tension and prevent ectropion.

## 2024-05-06 NOTE — ED ADULT NURSE NOTE - ISOLATION TYPE:
Occupational Therapy      Patient Name:  Emilia Melgoza   MRN:  [id number]    Patient has not passed the MOVE screen yet, so session limited to BUE PROM.     Pt was alert, making eye contact with OT throughout session. L soft wrist restraint in place. Pt tolerated the following PROM with HOB elevated:   RUE 1x15:   Wrist flexion/extension  Elbow flexion/extension   Forearm supination/pronation   Shoulder flexion to ~75*/extension    LUE 1x15:   Pt was able to complete 1x5 digit flexion/extension  Wrist flexion/extension  Forearm pronation/supination   - these were completed with LUE in soft restraint. After these exercises, pt became resistive and appeared to be agitated so OT stopped. Nurse, Sammie, notified.     Vitals:   125/58   98%  -139 bpm    O2 device: Aerosol T-Tube at 21%     Daughter, Iris, and other family member present. Pt's family was given PROM BUE handout before she was transferred to the ICU, but Iris did not have it so OT provided another PROM handout with pictures showing safe hand placement to assist the pt. OT encouraged Iris to have family/nursing PROM daily, 10 reps each as pt can tolerate. She did not have any further questions.     OT to follow up once pt has passed the MOVE screen. Pt's family and nursing to continue PROM daily.     Pt left with BUE elevated on pillows with all lines and leads intact, family present, and nurse, Sammie, notified.     Time In: 1436  Time Out: 1450   Total Time: 14 min: therapeutic exercise    Marquita Virk, OT  1/3/2020   None

## 2024-05-06 NOTE — ED PROVIDER NOTE - NSDCPRINTRESULTS_ED_ALL_ED
DISPLAY PLAN FREE TEXT Patient requests all Lab, Cardiology, and Radiology Results on their Discharge Instructions

## 2024-05-17 ENCOUNTER — APPOINTMENT (OUTPATIENT)
Dept: PULMONOLOGY | Facility: CLINIC | Age: 74
End: 2024-05-17
Payer: MEDICARE

## 2024-05-17 VITALS
DIASTOLIC BLOOD PRESSURE: 66 MMHG | BODY MASS INDEX: 23.9 KG/M2 | SYSTOLIC BLOOD PRESSURE: 126 MMHG | HEIGHT: 64 IN | RESPIRATION RATE: 16 BRPM | WEIGHT: 140 LBS

## 2024-05-17 VITALS — HEART RATE: 95 BPM | RESPIRATION RATE: 16 BRPM | OXYGEN SATURATION: 98 %

## 2024-05-17 PROCEDURE — 99215 OFFICE O/P EST HI 40 MIN: CPT

## 2024-05-17 NOTE — HISTORY OF PRESENT ILLNESS
[TextBox_4] : Care assumed. Former pt of Dr Cummings. Chart reviewed  73-year-old female with a history of severe sleep apnea seen today in follow-up status post inspire in July 2021.  Patient has had issues with throat irritation and subsequent spasm of her jaw as well as a painful ear which was addressed with adjustments.  She successfully lost approximately 25 pounds but has regained 15 since her last visit in July 2023.  Most recently her programming device has broken and has noted early contractures of her jaw before sleep.  She does complain of persistent snoring when she does not use her device as well as daytime somnolence.  History is also positive for sinus congestion and postnasal drip without sinus headaches fevers or chills.  Her last home sleep study performed in August of last year with her device off and after weight loss showed persistent AHI of 14.8  Current settings are 2.6 V

## 2024-05-17 NOTE — REASON FOR VISIT
[Initial] : an initial visit [Interpreters_FullName] : Magdalena Evans [Interpreters_Relationshiptopatient] : LPN [TWNoteComboBox1] : [place]

## 2024-05-17 NOTE — DISCUSSION/SUMMARY
[FreeTextEntry1] : Moderate to severe obstructive sleep apnea managed with hypoglossal nerve stimulation.  Current complaints most likely due to mild sleep onset insomnia with early activation of her unit as well as excessive voltage.  Adjustments are being made to 2.3V.  A new controller will be obtained

## 2024-05-17 NOTE — END OF VISIT
[FreeTextEntry3] : chart reviewed and case care assumed [Time Spent: ___ minutes] : I have spent [unfilled] minutes of time on the encounter.

## 2024-05-17 NOTE — CONSULT LETTER
[Dear  ___] : Dear  [unfilled], [Consult Letter:] : I had the pleasure of evaluating your patient, [unfilled]. [Please see my note below.] : Please see my note below. [Consult Closing:] : Thank you very much for allowing me to participate in the care of this patient.  If you have any questions, please do not hesitate to contact me. [Sincerely,] : Sincerely, [FreeTextEntry3] : Nuno Lema MD FCCP Pulmonary/Critical Care/Sleep Medicine Department of Internal Medicine  [facility]

## 2024-05-31 ENCOUNTER — APPOINTMENT (OUTPATIENT)
Dept: PULMONOLOGY | Facility: CLINIC | Age: 74
End: 2024-05-31
Payer: MEDICARE

## 2024-05-31 VITALS
RESPIRATION RATE: 16 BRPM | HEART RATE: 77 BPM | BODY MASS INDEX: 24.75 KG/M2 | WEIGHT: 145 LBS | SYSTOLIC BLOOD PRESSURE: 126 MMHG | HEIGHT: 64 IN | DIASTOLIC BLOOD PRESSURE: 73 MMHG | OXYGEN SATURATION: 98 %

## 2024-05-31 DIAGNOSIS — G47.33 OBSTRUCTIVE SLEEP APNEA (ADULT) (PEDIATRIC): ICD-10-CM

## 2024-05-31 PROCEDURE — 99214 OFFICE O/P EST MOD 30 MIN: CPT

## 2024-05-31 NOTE — END OF VISIT
[Time Spent: ___ minutes] : I have spent [unfilled] minutes of time on the encounter. [FreeTextEntry3] : chart reviewed and case care assumed

## 2024-05-31 NOTE — DISCUSSION/SUMMARY
[Obstructive Sleep Apnea] : obstructive sleep apnea [Severe] : severe [Responding to Treatment] : responding to treatment [Alcohol Avoidance] : alcohol avoidance [Sedative Avoidance] : sedative avoidance [Weight Loss Program] : weight loss program [Patient] : discussed with the patient [de-identified] : 2.4V inspire. New controller supplied

## 2024-05-31 NOTE — REASON FOR VISIT
[Initial] : an initial visit [Interpreters_FullName] : Sammy Denise [Interpreters_Relationshiptopatient] : MA [TWNoteComboBox1] : Jordanian

## 2024-05-31 NOTE — CONSULT LETTER
[Dear  ___] : Dear  [unfilled], [Consult Letter:] : I had the pleasure of evaluating your patient, [unfilled]. [Please see my note below.] : Please see my note below. [Consult Closing:] : Thank you very much for allowing me to participate in the care of this patient.  If you have any questions, please do not hesitate to contact me. [Sincerely,] : Sincerely, [FreeTextEntry3] : Nuno Lema MD FCCP Pulmonary/Critical Care/Sleep Medicine Department of Internal Medicine  [facility]

## 2024-05-31 NOTE — PHYSICAL EXAM
[No Acute Distress] : no acute distress [Normal Oropharynx] : normal oropharynx [Normal Appearance] : normal appearance [No Neck Mass] : no neck mass [Normal Rate/Rhythm] : normal rate/rhythm [Normal S1, S2] : normal s1, s2 [No Murmurs] : no murmurs [No Resp Distress] : no resp distress [Clear to Auscultation Bilaterally] : clear to auscultation bilaterally [No Abnormalities] : no abnormalities [Benign] : benign [Normal Gait] : normal gait [No Clubbing] : no clubbing [No Cyanosis] : no cyanosis [No Edema] : no edema [FROM] : FROM [Normal Color/ Pigmentation] : normal color/ pigmentation [No Focal Deficits] : no focal deficits [Oriented x3] : oriented x3 [Normal Affect] : normal affect [TextBox_11] : deviating tongue to right with activation

## 2024-05-31 NOTE — HISTORY OF PRESENT ILLNESS
[Obstructive Sleep Apnea] : obstructive sleep apnea [Home] : home [Awakes Unrefreshed] : does not awaken unrefreshed [Awakes with Dry Mouth] : does not awaken with dry mouth [Awakes with Headache] : does not awaken with headache [Snoring] : no snoring [Witnessed Apneas] : no witnessed apneas [TextBox_85] : 9 [TextBox_100] : 8/2/2023 [TextBox_108] : 14.8 [TextBox_120] : with inspire [TextBox_165] : Inspire 7/2021.  Needs new controller

## 2024-06-03 ENCOUNTER — OFFICE (OUTPATIENT)
Dept: URBAN - METROPOLITAN AREA CLINIC 94 | Facility: CLINIC | Age: 74
Setting detail: OPHTHALMOLOGY
End: 2024-06-03
Payer: MEDICARE

## 2024-06-03 DIAGNOSIS — E11.3313: ICD-10-CM

## 2024-06-03 DIAGNOSIS — H26.493: ICD-10-CM

## 2024-06-03 DIAGNOSIS — H43.812: ICD-10-CM

## 2024-06-03 DIAGNOSIS — H34.8310: ICD-10-CM

## 2024-06-03 DIAGNOSIS — H47.233: ICD-10-CM

## 2024-06-03 DIAGNOSIS — E11.3312: ICD-10-CM

## 2024-06-03 DIAGNOSIS — H35.3122: ICD-10-CM

## 2024-06-03 DIAGNOSIS — E11.3511: ICD-10-CM

## 2024-06-03 DIAGNOSIS — H35.3213: ICD-10-CM

## 2024-06-03 PROCEDURE — 99213 OFFICE O/P EST LOW 20 MIN: CPT | Mod: 24 | Performed by: OPHTHALMOLOGY

## 2024-06-03 PROCEDURE — 92133 CPTRZD OPH DX IMG PST SGM ON: CPT | Performed by: OPHTHALMOLOGY

## 2024-06-03 ASSESSMENT — CONFRONTATIONAL VISUAL FIELD TEST (CVF)
OD_FINDINGS: FULL
OS_FINDINGS: FULL

## 2024-06-17 ENCOUNTER — OFFICE (OUTPATIENT)
Dept: URBAN - METROPOLITAN AREA CLINIC 94 | Facility: CLINIC | Age: 74
Setting detail: OPHTHALMOLOGY
End: 2024-06-17
Payer: MEDICARE

## 2024-06-17 DIAGNOSIS — H43.812: ICD-10-CM

## 2024-06-17 DIAGNOSIS — E11.3312: ICD-10-CM

## 2024-06-17 DIAGNOSIS — E11.3313: ICD-10-CM

## 2024-06-17 DIAGNOSIS — H34.8310: ICD-10-CM

## 2024-06-17 DIAGNOSIS — H26.493: ICD-10-CM

## 2024-06-17 DIAGNOSIS — H35.3213: ICD-10-CM

## 2024-06-17 PROCEDURE — 67210 TREATMENT OF RETINAL LESION: CPT | Mod: 79,LT | Performed by: SPECIALIST

## 2024-06-17 PROCEDURE — 92235 FLUORESCEIN ANGRPH MLTIFRAME: CPT | Performed by: SPECIALIST

## 2024-06-17 PROCEDURE — 92134 CPTRZ OPH DX IMG PST SGM RTA: CPT | Performed by: SPECIALIST

## 2024-06-17 PROCEDURE — 92012 INTRM OPH EXAM EST PATIENT: CPT | Mod: 24,57 | Performed by: SPECIALIST

## 2024-06-20 ENCOUNTER — OFFICE (OUTPATIENT)
Dept: URBAN - METROPOLITAN AREA CLINIC 94 | Facility: CLINIC | Age: 74
Setting detail: OPHTHALMOLOGY
End: 2024-06-20
Payer: MEDICARE

## 2024-06-20 DIAGNOSIS — H26.491: ICD-10-CM

## 2024-06-20 PROCEDURE — 99024 POSTOP FOLLOW-UP VISIT: CPT | Mod: RT | Performed by: OPHTHALMOLOGY

## 2024-06-20 ASSESSMENT — CONFRONTATIONAL VISUAL FIELD TEST (CVF)
OD_FINDINGS: FULL
OS_FINDINGS: FULL

## 2024-06-27 ENCOUNTER — OFFICE (OUTPATIENT)
Dept: URBAN - METROPOLITAN AREA CLINIC 94 | Facility: CLINIC | Age: 74
Setting detail: OPHTHALMOLOGY
End: 2024-06-27
Payer: MEDICARE

## 2024-06-27 DIAGNOSIS — H47.233: ICD-10-CM

## 2024-06-27 DIAGNOSIS — H52.4: ICD-10-CM

## 2024-06-27 DIAGNOSIS — H26.491: ICD-10-CM

## 2024-06-27 DIAGNOSIS — H04.123: ICD-10-CM

## 2024-06-27 PROBLEM — E11.3511 DM TYPE 2; RIGHT PROLIFERATIVE WITH ME, LEFT MOD WITH ME: Status: ACTIVE | Noted: 2024-06-20

## 2024-06-27 PROBLEM — E11.3313 DM TYPE 2; RIGHT PROLIFERATIVE WITH ME, LEFT MOD WITH ME: Status: ACTIVE | Noted: 2024-06-20

## 2024-06-27 PROBLEM — E11.3312 DM TYPE 2; RIGHT PROLIFERATIVE WITH ME, LEFT MOD WITH ME: Status: ACTIVE | Noted: 2024-06-20

## 2024-06-27 PROCEDURE — 92015 DETERMINE REFRACTIVE STATE: CPT | Performed by: OPHTHALMOLOGY

## 2024-06-27 PROCEDURE — 92133 CPTRZD OPH DX IMG PST SGM ON: CPT | Performed by: OPHTHALMOLOGY

## 2024-06-27 PROCEDURE — 76514 ECHO EXAM OF EYE THICKNESS: CPT | Performed by: OPHTHALMOLOGY

## 2024-06-27 PROCEDURE — 99213 OFFICE O/P EST LOW 20 MIN: CPT | Mod: 24 | Performed by: OPHTHALMOLOGY

## 2024-06-27 ASSESSMENT — CONFRONTATIONAL VISUAL FIELD TEST (CVF)
OS_FINDINGS: FULL
OD_FINDINGS: FULL

## 2024-07-22 ENCOUNTER — OFFICE (OUTPATIENT)
Dept: URBAN - METROPOLITAN AREA CLINIC 94 | Facility: CLINIC | Age: 74
Setting detail: OPHTHALMOLOGY
End: 2024-07-22
Payer: MEDICARE

## 2024-07-22 DIAGNOSIS — H35.3213: ICD-10-CM

## 2024-07-22 DIAGNOSIS — E11.3511: ICD-10-CM

## 2024-07-22 DIAGNOSIS — H35.3122: ICD-10-CM

## 2024-07-22 DIAGNOSIS — H43.812: ICD-10-CM

## 2024-07-22 DIAGNOSIS — H34.8310: ICD-10-CM

## 2024-07-22 DIAGNOSIS — E11.3313: ICD-10-CM

## 2024-07-22 PROCEDURE — 92134 CPTRZ OPH DX IMG PST SGM RTA: CPT | Performed by: SPECIALIST

## 2024-07-22 PROCEDURE — 99024 POSTOP FOLLOW-UP VISIT: CPT | Performed by: SPECIALIST

## 2024-07-22 PROCEDURE — 67028 INJECTION EYE DRUG: CPT | Mod: 79,RT | Performed by: SPECIALIST

## 2024-07-22 ASSESSMENT — CONFRONTATIONAL VISUAL FIELD TEST (CVF)
OD_FINDINGS: FULL
OS_FINDINGS: FULL

## 2024-08-07 ENCOUNTER — APPOINTMENT (OUTPATIENT)
Dept: PULMONOLOGY | Facility: CLINIC | Age: 74
End: 2024-08-07

## 2024-08-19 ENCOUNTER — OFFICE (OUTPATIENT)
Dept: URBAN - METROPOLITAN AREA CLINIC 94 | Facility: CLINIC | Age: 74
Setting detail: OPHTHALMOLOGY
End: 2024-08-19
Payer: MEDICARE

## 2024-08-19 DIAGNOSIS — H43.812: ICD-10-CM

## 2024-08-19 DIAGNOSIS — E11.3313: ICD-10-CM

## 2024-08-19 DIAGNOSIS — E11.3511: ICD-10-CM

## 2024-08-19 DIAGNOSIS — H35.3213: ICD-10-CM

## 2024-08-19 DIAGNOSIS — H35.3122: ICD-10-CM

## 2024-08-19 DIAGNOSIS — H34.8310: ICD-10-CM

## 2024-08-19 PROCEDURE — 99024 POSTOP FOLLOW-UP VISIT: CPT | Performed by: SPECIALIST

## 2024-08-19 PROCEDURE — 92134 CPTRZ OPH DX IMG PST SGM RTA: CPT | Performed by: SPECIALIST

## 2024-08-19 PROCEDURE — 67210 TREATMENT OF RETINAL LESION: CPT | Mod: 79,RT | Performed by: SPECIALIST

## 2024-08-29 ENCOUNTER — OFFICE (OUTPATIENT)
Dept: URBAN - METROPOLITAN AREA CLINIC 94 | Facility: CLINIC | Age: 74
Setting detail: OPHTHALMOLOGY
End: 2024-08-29
Payer: MEDICARE

## 2024-08-29 DIAGNOSIS — H34.8310: ICD-10-CM

## 2024-08-29 DIAGNOSIS — H02.421: ICD-10-CM

## 2024-08-29 DIAGNOSIS — H43.812: ICD-10-CM

## 2024-08-29 DIAGNOSIS — E11.3313: ICD-10-CM

## 2024-08-29 DIAGNOSIS — E11.3312: ICD-10-CM

## 2024-08-29 DIAGNOSIS — E11.3511: ICD-10-CM

## 2024-08-29 DIAGNOSIS — H35.3122: ICD-10-CM

## 2024-08-29 DIAGNOSIS — H04.123: ICD-10-CM

## 2024-08-29 DIAGNOSIS — H02.422: ICD-10-CM

## 2024-08-29 DIAGNOSIS — H35.3213: ICD-10-CM

## 2024-08-29 DIAGNOSIS — H47.233: ICD-10-CM

## 2024-08-29 PROCEDURE — 92250 FUNDUS PHOTOGRAPHY W/I&R: CPT | Performed by: OPHTHALMOLOGY

## 2024-08-29 PROCEDURE — 99213 OFFICE O/P EST LOW 20 MIN: CPT | Mod: 24 | Performed by: OPHTHALMOLOGY

## 2024-08-29 PROCEDURE — 92083 EXTENDED VISUAL FIELD XM: CPT | Performed by: OPHTHALMOLOGY

## 2024-08-29 ASSESSMENT — CONFRONTATIONAL VISUAL FIELD TEST (CVF)
OS_FINDINGS: FULL
OD_FINDINGS: FULL

## 2024-09-03 ENCOUNTER — OFFICE (OUTPATIENT)
Dept: URBAN - METROPOLITAN AREA CLINIC 94 | Facility: CLINIC | Age: 74
Setting detail: OPHTHALMOLOGY
End: 2024-09-03
Payer: MEDICARE

## 2024-09-03 DIAGNOSIS — H02.421: ICD-10-CM

## 2024-09-03 DIAGNOSIS — H02.422: ICD-10-CM

## 2024-09-03 PROCEDURE — 99024 POSTOP FOLLOW-UP VISIT: CPT | Mod: 25 | Performed by: OPHTHALMOLOGY

## 2024-09-11 ENCOUNTER — APPOINTMENT (OUTPATIENT)
Dept: PULMONOLOGY | Facility: CLINIC | Age: 74
End: 2024-09-11
Payer: MEDICARE

## 2024-09-11 VITALS
HEIGHT: 63.25 IN | WEIGHT: 142 LBS | HEART RATE: 65 BPM | BODY MASS INDEX: 24.85 KG/M2 | SYSTOLIC BLOOD PRESSURE: 126 MMHG | DIASTOLIC BLOOD PRESSURE: 70 MMHG | OXYGEN SATURATION: 97 % | RESPIRATION RATE: 16 BRPM

## 2024-09-11 PROCEDURE — G2211 COMPLEX E/M VISIT ADD ON: CPT

## 2024-09-11 PROCEDURE — 99214 OFFICE O/P EST MOD 30 MIN: CPT

## 2024-09-11 RX ORDER — SITAGLIPTIN 100 MG/1
100 TABLET, FILM COATED ORAL
Refills: 0 | Status: ACTIVE | COMMUNITY

## 2024-09-11 RX ORDER — EVOLOCUMAB 140 MG/ML
140 INJECTION, SOLUTION SUBCUTANEOUS
Refills: 0 | Status: ACTIVE | COMMUNITY

## 2024-09-11 NOTE — REASON FOR VISIT
[Initial] : an initial visit [Sleep Apnea] : sleep apnea [Ad Hoc ] : provided by an ad hoc  [Interpreters_FullName] : Leeanna [Interpreters_Relationshiptopatient] : daughter [TWNoteComboBox1] : Malagasy

## 2024-09-11 NOTE — DISCUSSION/SUMMARY
[Obstructive Sleep Apnea] : obstructive sleep apnea [Severe] : severe [Responding to Treatment] : responding to treatment [Alcohol Avoidance] : alcohol avoidance [Sedative Avoidance] : sedative avoidance [Weight Loss Program] : weight loss program [Patient] : discussed with the patient [de-identified] : 2.4V inspire.

## 2024-09-11 NOTE — HISTORY OF PRESENT ILLNESS
[Obstructive Sleep Apnea] : obstructive sleep apnea [Home] : home [Awakes Unrefreshed] : does not awaken unrefreshed [Awakes with Dry Mouth] : does not awaken with dry mouth [Awakes with Headache] : does not awaken with headache [Snoring] : no snoring [Witnessed Apneas] : no witnessed apneas [TextBox_85] : 9 [TextBox_100] : 8/2/2023 [TextBox_108] : 14.8 [TextBox_120] : with inspire [TextBox_165] : Inspire 7/2021.  Still notes some discomfort at times with firing of unit  [TextBox_4] :   INSPIRE Amplitude 2.3v Range 2.1-3.1 Duration 10 hrs Pulse width 90 msec Rate 33 Config +-+ [ESS] : 0

## 2024-09-11 NOTE — CONSULT LETTER
[Dear  ___] : Dear  [unfilled], [Consult Letter:] : I had the pleasure of evaluating your patient, [unfilled]. [Please see my note below.] : Please see my note below. [Consult Closing:] : Thank you very much for allowing me to participate in the care of this patient.  If you have any questions, please do not hesitate to contact me. [Sincerely,] : Sincerely, [FreeTextEntry3] : Nuno Lema MD FCCP Pulmonary/Critical Care/Sleep Medicine Department of Internal Medicine  [facility]

## 2024-09-11 NOTE — END OF VISIT
[Time Spent: ___ minutes] : I have spent [unfilled] minutes of time on the encounter which excludes teaching and separately reported services. [FreeTextEntry3] : chart reviewed and case care assumed

## 2024-09-12 DIAGNOSIS — G47.33 OBSTRUCTIVE SLEEP APNEA (ADULT) (PEDIATRIC): ICD-10-CM

## 2024-09-23 ENCOUNTER — OFFICE (OUTPATIENT)
Dept: URBAN - METROPOLITAN AREA CLINIC 94 | Facility: CLINIC | Age: 74
Setting detail: OPHTHALMOLOGY
End: 2024-09-23
Payer: MEDICARE

## 2024-09-23 DIAGNOSIS — H35.3122: ICD-10-CM

## 2024-09-23 DIAGNOSIS — E11.3312: ICD-10-CM

## 2024-09-23 DIAGNOSIS — H35.3213: ICD-10-CM

## 2024-09-23 DIAGNOSIS — H43.812: ICD-10-CM

## 2024-09-23 DIAGNOSIS — E11.3313: ICD-10-CM

## 2024-09-23 DIAGNOSIS — H34.8310: ICD-10-CM

## 2024-09-23 PROCEDURE — 92134 CPTRZ OPH DX IMG PST SGM RTA: CPT | Performed by: SPECIALIST

## 2024-09-23 PROCEDURE — 67210 TREATMENT OF RETINAL LESION: CPT | Mod: 79,LT | Performed by: SPECIALIST

## 2024-09-23 ASSESSMENT — CONFRONTATIONAL VISUAL FIELD TEST (CVF)
OD_FINDINGS: FULL
OS_FINDINGS: FULL

## 2024-10-14 ENCOUNTER — OFFICE (OUTPATIENT)
Dept: URBAN - METROPOLITAN AREA CLINIC 94 | Facility: CLINIC | Age: 74
Setting detail: OPHTHALMOLOGY
End: 2024-10-14
Payer: MEDICARE

## 2024-10-14 DIAGNOSIS — E11.3313: ICD-10-CM

## 2024-10-14 DIAGNOSIS — H35.3213: ICD-10-CM

## 2024-10-14 DIAGNOSIS — E11.3511: ICD-10-CM

## 2024-10-14 PROCEDURE — 92134 CPTRZ OPH DX IMG PST SGM RTA: CPT | Performed by: SPECIALIST

## 2024-10-14 PROCEDURE — 67028 INJECTION EYE DRUG: CPT | Mod: 58,RT | Performed by: SPECIALIST

## 2024-10-14 ASSESSMENT — VISUAL ACUITY
OD_BCVA: 20/20
OS_BCVA: 20/40-1

## 2024-10-14 ASSESSMENT — PACHYMETRY
OD_CT_UM: 515
OD_CT_CORRECTION: 2
OS_CT_UM: 519
OS_CT_CORRECTION: 2

## 2024-10-14 ASSESSMENT — KERATOMETRY
OD_K2POWER_DIOPTERS: 44.50
METHOD_AUTO_MANUAL: AUTO
OS_AXISANGLE_DEGREES: 005
OS_K1POWER_DIOPTERS: 42.75
OS_K2POWER_DIOPTERS: 44.25
OD_K1POWER_DIOPTERS: 44.00
OD_AXISANGLE_DEGREES: 065

## 2024-10-14 ASSESSMENT — REFRACTION_AUTOREFRACTION
OD_SPHERE: -0.50
OD_CYLINDER: -0.50
OS_AXIS: 090
OS_SPHERE: +0.75
OS_CYLINDER: -2.00
OD_AXIS: 120

## 2024-10-14 ASSESSMENT — TONOMETRY
OD_IOP_MMHG: 17
OS_IOP_MMHG: 16

## 2024-10-14 ASSESSMENT — CONFRONTATIONAL VISUAL FIELD TEST (CVF)
OD_FINDINGS: FULL
OS_FINDINGS: FULL

## 2024-10-28 ENCOUNTER — OFFICE (OUTPATIENT)
Dept: URBAN - METROPOLITAN AREA CLINIC 94 | Facility: CLINIC | Age: 74
Setting detail: OPHTHALMOLOGY
End: 2024-10-28
Payer: MEDICARE

## 2024-10-28 DIAGNOSIS — H35.3122: ICD-10-CM

## 2024-10-28 DIAGNOSIS — H43.812: ICD-10-CM

## 2024-10-28 DIAGNOSIS — H34.8310: ICD-10-CM

## 2024-10-28 DIAGNOSIS — E11.3313: ICD-10-CM

## 2024-10-28 DIAGNOSIS — H35.3213: ICD-10-CM

## 2024-10-28 PROCEDURE — 92134 CPTRZ OPH DX IMG PST SGM RTA: CPT | Performed by: SPECIALIST

## 2024-10-28 PROCEDURE — 99024 POSTOP FOLLOW-UP VISIT: CPT | Performed by: SPECIALIST

## 2024-10-28 ASSESSMENT — KERATOMETRY
OD_K2POWER_DIOPTERS: 44.50
OD_AXISANGLE_DEGREES: 065
OD_K1POWER_DIOPTERS: 44.00
METHOD_AUTO_MANUAL: AUTO
OS_AXISANGLE_DEGREES: 005
OS_K2POWER_DIOPTERS: 44.25
OS_K1POWER_DIOPTERS: 42.75

## 2024-10-28 ASSESSMENT — PACHYMETRY
OD_CT_UM: 515
OD_CT_CORRECTION: 2
OS_CT_CORRECTION: 2
OS_CT_UM: 519

## 2024-10-28 ASSESSMENT — REFRACTION_AUTOREFRACTION
OS_SPHERE: +0.75
OD_AXIS: 120
OS_CYLINDER: -2.00
OD_CYLINDER: -0.50
OD_SPHERE: -0.50
OS_AXIS: 090

## 2024-10-28 ASSESSMENT — VISUAL ACUITY
OD_BCVA: 20/40
OS_BCVA: 20/50

## 2024-10-28 ASSESSMENT — CONFRONTATIONAL VISUAL FIELD TEST (CVF)
OD_FINDINGS: FULL
OS_FINDINGS: FULL

## 2024-10-28 ASSESSMENT — TONOMETRY: OS_IOP_MMHG: 14

## 2024-11-07 ENCOUNTER — APPOINTMENT (OUTPATIENT)
Dept: PULMONOLOGY | Facility: CLINIC | Age: 74
End: 2024-11-07

## 2024-11-10 ENCOUNTER — OUTPATIENT (OUTPATIENT)
Dept: OUTPATIENT SERVICES | Facility: HOSPITAL | Age: 74
LOS: 1 days | End: 2024-11-10
Payer: MEDICARE

## 2024-11-10 DIAGNOSIS — G47.33 OBSTRUCTIVE SLEEP APNEA (ADULT) (PEDIATRIC): ICD-10-CM
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
## 2024-11-10 DIAGNOSIS — Z98.890 OTHER SPECIFIED POSTPROCEDURAL STATES: Chronic | ICD-10-CM

## 2024-11-10 DIAGNOSIS — Z98.82 BREAST IMPLANT STATUS: Chronic | ICD-10-CM

## 2024-11-10 DIAGNOSIS — Z98.49 CATARACT EXTRACTION STATUS, UNSPECIFIED EYE: Chronic | ICD-10-CM

## 2024-11-10 PROCEDURE — 95810 POLYSOM 6/> YRS 4/> PARAM: CPT | Mod: 26

## 2024-11-10 PROCEDURE — 95810 POLYSOM 6/> YRS 4/> PARAM: CPT

## 2024-11-20 ENCOUNTER — APPOINTMENT (OUTPATIENT)
Dept: PULMONOLOGY | Facility: CLINIC | Age: 74
End: 2024-11-20
Payer: MEDICARE

## 2024-11-20 VITALS
DIASTOLIC BLOOD PRESSURE: 70 MMHG | RESPIRATION RATE: 16 BRPM | HEIGHT: 63.25 IN | WEIGHT: 140 LBS | HEART RATE: 70 BPM | BODY MASS INDEX: 24.5 KG/M2 | SYSTOLIC BLOOD PRESSURE: 125 MMHG | OXYGEN SATURATION: 97 %

## 2024-11-20 DIAGNOSIS — G47.33 OBSTRUCTIVE SLEEP APNEA (ADULT) (PEDIATRIC): ICD-10-CM

## 2024-11-20 PROCEDURE — 99214 OFFICE O/P EST MOD 30 MIN: CPT

## 2024-11-20 PROCEDURE — G2211 COMPLEX E/M VISIT ADD ON: CPT

## 2024-11-25 ENCOUNTER — OFFICE (OUTPATIENT)
Dept: URBAN - METROPOLITAN AREA CLINIC 94 | Facility: CLINIC | Age: 74
Setting detail: OPHTHALMOLOGY
End: 2024-11-25
Payer: MEDICARE

## 2024-11-25 DIAGNOSIS — H35.3213: ICD-10-CM

## 2024-11-25 DIAGNOSIS — H35.3122: ICD-10-CM

## 2024-11-25 DIAGNOSIS — E11.3313: ICD-10-CM

## 2024-11-25 DIAGNOSIS — H34.8310: ICD-10-CM

## 2024-11-25 DIAGNOSIS — H43.812: ICD-10-CM

## 2024-11-25 DIAGNOSIS — E11.3511: ICD-10-CM

## 2024-11-25 PROCEDURE — 92235 FLUORESCEIN ANGRPH MLTIFRAME: CPT | Performed by: SPECIALIST

## 2024-11-25 PROCEDURE — 67210 TREATMENT OF RETINAL LESION: CPT | Mod: 79,RT | Performed by: SPECIALIST

## 2024-11-25 PROCEDURE — 92134 CPTRZ OPH DX IMG PST SGM RTA: CPT | Performed by: SPECIALIST

## 2024-11-25 PROCEDURE — 99024 POSTOP FOLLOW-UP VISIT: CPT | Performed by: SPECIALIST

## 2024-11-25 ASSESSMENT — VISUAL ACUITY
OS_BCVA: 20/50
OD_BCVA: 20/20-1

## 2024-11-25 ASSESSMENT — CONFRONTATIONAL VISUAL FIELD TEST (CVF)
OD_FINDINGS: FULL
OS_FINDINGS: FULL

## 2024-11-25 ASSESSMENT — KERATOMETRY
OD_K1POWER_DIOPTERS: 44.00
OS_K1POWER_DIOPTERS: 42.75
OS_K2POWER_DIOPTERS: 44.25
OD_K2POWER_DIOPTERS: 44.50
OD_AXISANGLE_DEGREES: 065
METHOD_AUTO_MANUAL: AUTO
OS_AXISANGLE_DEGREES: 005

## 2024-11-25 ASSESSMENT — TONOMETRY
OD_IOP_MMHG: 19
OS_IOP_MMHG: 15

## 2024-11-25 ASSESSMENT — REFRACTION_AUTOREFRACTION
OS_CYLINDER: -2.00
OS_AXIS: 090
OD_SPHERE: -0.50
OD_CYLINDER: -0.50
OD_AXIS: 120
OS_SPHERE: +0.75

## 2024-11-25 ASSESSMENT — PACHYMETRY
OD_CT_UM: 515
OD_CT_CORRECTION: 2
OS_CT_UM: 519
OS_CT_CORRECTION: 2

## 2025-01-07 NOTE — H&P PST ADULT - NEUROLOGICAL
07-Jan-2025 10:00 details… Alert & oriented; no sensory, motor or coordination deficits, normal reflexes

## 2025-01-20 ENCOUNTER — OFFICE (OUTPATIENT)
Dept: URBAN - METROPOLITAN AREA CLINIC 94 | Facility: CLINIC | Age: 75
Setting detail: OPHTHALMOLOGY
End: 2025-01-20
Payer: MEDICARE

## 2025-01-20 DIAGNOSIS — H35.3213: ICD-10-CM

## 2025-01-20 DIAGNOSIS — H43.812: ICD-10-CM

## 2025-01-20 DIAGNOSIS — H34.8310: ICD-10-CM

## 2025-01-20 DIAGNOSIS — E11.3312: ICD-10-CM

## 2025-01-20 DIAGNOSIS — E11.3313: ICD-10-CM

## 2025-01-20 DIAGNOSIS — H35.3122: ICD-10-CM

## 2025-01-20 PROCEDURE — 92134 CPTRZ OPH DX IMG PST SGM RTA: CPT | Performed by: SPECIALIST

## 2025-01-20 PROCEDURE — 67210 TREATMENT OF RETINAL LESION: CPT | Mod: 79,LT | Performed by: SPECIALIST

## 2025-01-20 ASSESSMENT — REFRACTION_AUTOREFRACTION
OS_AXIS: 090
OS_SPHERE: +0.75
OD_AXIS: 120
OD_CYLINDER: -0.50
OD_SPHERE: -0.50
OS_CYLINDER: -2.00

## 2025-01-20 ASSESSMENT — PACHYMETRY
OD_CT_UM: 515
OS_CT_CORRECTION: 2
OD_CT_CORRECTION: 2
OS_CT_UM: 519

## 2025-01-20 ASSESSMENT — KERATOMETRY
OS_K2POWER_DIOPTERS: 44.25
OS_K1POWER_DIOPTERS: 42.75
OS_AXISANGLE_DEGREES: 005
METHOD_AUTO_MANUAL: AUTO
OD_K1POWER_DIOPTERS: 44.00
OD_AXISANGLE_DEGREES: 065
OD_K2POWER_DIOPTERS: 44.50

## 2025-01-20 ASSESSMENT — CONFRONTATIONAL VISUAL FIELD TEST (CVF)
OD_FINDINGS: FULL
OS_FINDINGS: FULL

## 2025-01-20 ASSESSMENT — TONOMETRY
OD_IOP_MMHG: 15
OS_IOP_MMHG: 15

## 2025-01-20 ASSESSMENT — VISUAL ACUITY
OS_BCVA: 20/50
OD_BCVA: 20/20

## 2025-02-10 ENCOUNTER — OFFICE (OUTPATIENT)
Dept: URBAN - METROPOLITAN AREA CLINIC 94 | Facility: CLINIC | Age: 75
Setting detail: OPHTHALMOLOGY
End: 2025-02-10
Payer: MEDICARE

## 2025-02-10 DIAGNOSIS — H35.3122: ICD-10-CM

## 2025-02-10 DIAGNOSIS — E11.3511: ICD-10-CM

## 2025-02-10 DIAGNOSIS — H34.8310: ICD-10-CM

## 2025-02-10 DIAGNOSIS — H43.812: ICD-10-CM

## 2025-02-10 DIAGNOSIS — H35.3213: ICD-10-CM

## 2025-02-10 DIAGNOSIS — E11.3312: ICD-10-CM

## 2025-02-10 DIAGNOSIS — E11.3313: ICD-10-CM

## 2025-02-10 PROCEDURE — 92134 CPTRZ OPH DX IMG PST SGM RTA: CPT | Performed by: SPECIALIST

## 2025-02-10 PROCEDURE — 67028 INJECTION EYE DRUG: CPT | Mod: 58,RT | Performed by: SPECIALIST

## 2025-02-10 PROCEDURE — 99024 POSTOP FOLLOW-UP VISIT: CPT | Performed by: SPECIALIST

## 2025-02-10 ASSESSMENT — KERATOMETRY
OD_K2POWER_DIOPTERS: 44.50
METHOD_AUTO_MANUAL: AUTO
OD_AXISANGLE_DEGREES: 065
OS_K2POWER_DIOPTERS: 44.25
OS_AXISANGLE_DEGREES: 005
OS_K1POWER_DIOPTERS: 42.75
OD_K1POWER_DIOPTERS: 44.00

## 2025-02-10 ASSESSMENT — PACHYMETRY
OS_CT_CORRECTION: 2
OS_CT_UM: 519
OD_CT_UM: 515
OD_CT_CORRECTION: 2

## 2025-02-10 ASSESSMENT — REFRACTION_AUTOREFRACTION
OD_SPHERE: -0.50
OD_AXIS: 120
OS_AXIS: 090
OS_CYLINDER: -2.00
OD_CYLINDER: -0.50
OS_SPHERE: +0.75

## 2025-02-10 ASSESSMENT — CONFRONTATIONAL VISUAL FIELD TEST (CVF)
OD_FINDINGS: FULL
OS_FINDINGS: FULL

## 2025-02-10 ASSESSMENT — VISUAL ACUITY
OS_BCVA: 20/40-1
OD_BCVA: 20/25-

## 2025-02-10 ASSESSMENT — TONOMETRY
OS_IOP_MMHG: 14
OD_IOP_MMHG: 15

## 2025-03-03 ENCOUNTER — OFFICE (OUTPATIENT)
Dept: URBAN - METROPOLITAN AREA CLINIC 94 | Facility: CLINIC | Age: 75
Setting detail: OPHTHALMOLOGY
End: 2025-03-03
Payer: MEDICARE

## 2025-03-03 DIAGNOSIS — H35.3122: ICD-10-CM

## 2025-03-03 DIAGNOSIS — H35.3213: ICD-10-CM

## 2025-03-03 DIAGNOSIS — H43.812: ICD-10-CM

## 2025-03-03 DIAGNOSIS — H34.8310: ICD-10-CM

## 2025-03-03 PROCEDURE — 92134 CPTRZ OPH DX IMG PST SGM RTA: CPT | Performed by: SPECIALIST

## 2025-03-03 PROCEDURE — 67210 TREATMENT OF RETINAL LESION: CPT | Mod: 79,LT | Performed by: SPECIALIST

## 2025-03-03 ASSESSMENT — REFRACTION_AUTOREFRACTION
OD_AXIS: 120
OS_CYLINDER: -2.00
OD_CYLINDER: -0.50
OS_AXIS: 090
OS_SPHERE: +0.75
OD_SPHERE: -0.50

## 2025-03-03 ASSESSMENT — KERATOMETRY
OD_AXISANGLE_DEGREES: 065
OS_K2POWER_DIOPTERS: 44.25
OD_K1POWER_DIOPTERS: 44.00
OD_K2POWER_DIOPTERS: 44.50
METHOD_AUTO_MANUAL: AUTO
OS_AXISANGLE_DEGREES: 005
OS_K1POWER_DIOPTERS: 42.75

## 2025-03-03 ASSESSMENT — VISUAL ACUITY
OS_BCVA: 20/50+1
OD_BCVA: 20/30+1

## 2025-03-03 ASSESSMENT — CONFRONTATIONAL VISUAL FIELD TEST (CVF)
OD_FINDINGS: FULL
OS_FINDINGS: FULL

## 2025-03-04 ENCOUNTER — OFFICE (OUTPATIENT)
Dept: URBAN - METROPOLITAN AREA CLINIC 94 | Facility: CLINIC | Age: 75
Setting detail: OPHTHALMOLOGY
End: 2025-03-04
Payer: MEDICARE

## 2025-03-04 DIAGNOSIS — H02.524: ICD-10-CM

## 2025-03-04 DIAGNOSIS — H02.422: ICD-10-CM

## 2025-03-04 DIAGNOSIS — H02.521: ICD-10-CM

## 2025-03-04 DIAGNOSIS — T81.32XA: ICD-10-CM

## 2025-03-04 DIAGNOSIS — H02.421: ICD-10-CM

## 2025-03-04 PROCEDURE — 66250 FOLLOW-UP SURGERY OF EYE: CPT | Mod: 78,E1 | Performed by: OPHTHALMOLOGY

## 2025-03-04 PROCEDURE — 99024 POSTOP FOLLOW-UP VISIT: CPT | Mod: 24 | Performed by: OPHTHALMOLOGY

## 2025-03-04 PROCEDURE — 92285 EXTERNAL OCULAR PHOTOGRAPHY: CPT | Performed by: OPHTHALMOLOGY

## 2025-03-04 ASSESSMENT — PACHYMETRY
OS_CT_CORRECTION: 2
OD_CT_UM: 515
OD_CT_CORRECTION: 2
OS_CT_UM: 519

## 2025-03-04 ASSESSMENT — TONOMETRY
OS_IOP_MMHG: 15
OD_IOP_MMHG: 16

## 2025-03-04 ASSESSMENT — CONFRONTATIONAL VISUAL FIELD TEST (CVF)
OS_FINDINGS: FULL
OD_FINDINGS: FULL

## 2025-03-12 ENCOUNTER — RX ONLY (RX ONLY)
Age: 75
End: 2025-03-12

## 2025-03-12 ASSESSMENT — KERATOMETRY
OS_K2POWER_DIOPTERS: 44.25
OS_AXISANGLE_DEGREES: 005
METHOD_AUTO_MANUAL: AUTO
OD_AXISANGLE_DEGREES: 065
OD_K2POWER_DIOPTERS: 44.50
OS_K1POWER_DIOPTERS: 42.75
OD_K1POWER_DIOPTERS: 44.00

## 2025-03-12 ASSESSMENT — VISUAL ACUITY
OS_BCVA: 20/40
OD_BCVA: 20/25+1

## 2025-03-12 ASSESSMENT — REFRACTION_AUTOREFRACTION
OS_SPHERE: +0.75
OD_AXIS: 120
OD_CYLINDER: -0.50
OS_AXIS: 090
OS_CYLINDER: -2.00
OD_SPHERE: -0.50

## 2025-03-13 ENCOUNTER — OFFICE (OUTPATIENT)
Dept: URBAN - METROPOLITAN AREA CLINIC 94 | Facility: CLINIC | Age: 75
Setting detail: OPHTHALMOLOGY
End: 2025-03-13
Payer: MEDICARE

## 2025-03-13 DIAGNOSIS — H47.233: ICD-10-CM

## 2025-03-13 DIAGNOSIS — H02.423: ICD-10-CM

## 2025-03-13 PROCEDURE — 92133 CPTRZD OPH DX IMG PST SGM ON: CPT | Performed by: OPHTHALMOLOGY

## 2025-03-13 PROCEDURE — 99213 OFFICE O/P EST LOW 20 MIN: CPT | Mod: 24 | Performed by: OPHTHALMOLOGY

## 2025-03-13 PROCEDURE — 92083 EXTENDED VISUAL FIELD XM: CPT | Performed by: OPHTHALMOLOGY

## 2025-03-13 ASSESSMENT — PACHYMETRY
OS_CT_CORRECTION: 2
OD_CT_UM: 515
OS_CT_UM: 519
OD_CT_CORRECTION: 2

## 2025-03-13 ASSESSMENT — REFRACTION_AUTOREFRACTION
OS_AXIS: 094
OS_CYLINDER: -1.50
OD_CYLINDER: -1.50
OD_AXIS: 098
OD_SPHERE: -0.50
OS_SPHERE: +0.25

## 2025-03-13 ASSESSMENT — CONFRONTATIONAL VISUAL FIELD TEST (CVF)
OD_FINDINGS: FULL
OS_FINDINGS: FULL

## 2025-03-13 ASSESSMENT — VISUAL ACUITY
OS_BCVA: 20/40-1
OD_BCVA: 20/20-1

## 2025-03-13 ASSESSMENT — TONOMETRY
OD_IOP_MMHG: 17
OS_IOP_MMHG: 13

## 2025-03-13 ASSESSMENT — KERATOMETRY
OD_K2POWER_DIOPTERS: 43.75
OD_AXISANGLE_DEGREES: 060
OS_K1POWER_DIOPTERS: 43.75
OS_K2POWER_DIOPTERS: 44.25
OD_K1POWER_DIOPTERS: 42.75
OS_AXISANGLE_DEGREES: 019
METHOD_AUTO_MANUAL: AUTO

## 2025-03-14 NOTE — ED ADULT TRIAGE NOTE - BEFAST SPEECH SLURRED
I took sign out for this patient. Briefly, this is a 84M with h/o HTN, paroxysmal atrial fibrillation on Eliquis who presented to the ED with c/o a mechanical trip and fall yesterday where he fell backwards, hitting his head but not losing consciousness. While in the ED the patient was hypertensive in the setting of not taking his morning medication, though this improved with a dose of IV hydralazine. Lab work notable for creatinine 2.38 (baseline 1.9), troponin 35. A CT scan of the head and cervical spine were reassuring. At the time of sign out, disposition is pending a CXR and blood pressure reassessment after his home medication. If reassuring, he can likely be discharged with outpatient follow up.    ED Course as of 03/14/25 1726   Fri Mar 14, 2025   1525 XR CHEST AP OR PA  Per radiology:   1. Mild central pulmonary vasculature prominence, somewhat similar comparison to prior 12/4/2023.  2. Minimal bibasilar atelectasis or scarring without substantial focal consolidation. [AW]   1525 BP(!): 175/100 [AW]   1614 BP(!): 202/107  Blood pressure has remained elevated, will give a 2nd dose of hydralazine. [AW]   1703 I spoke with the teaching service who had no questions and will continue to monitor his blood pressure. [AW]   1703 BP(!): 152/103 [AW]      ED Course User Index  [AW] Vandana Siddiqui MD     ED Diagnoses       Diagnosis Comment Associated Orders       Final diagnoses    Fall in home, initial encounter -- --    Neck pain -- --    Hypertension, unspecified type -- --    Renal insufficiency -- --          Disposition        Admit 3/14/2025  5:04 PM  Telemetry Bed?: Yes  Admitting Physician: RUTHIE MIKE [[id number]]  Observation Unit Appropriate?: No  Is this a telephone or verbal order?: This is a telephone order from the admitting physician      Vandana Siddiqui MD  03/14/25 1726     No

## 2025-03-18 ENCOUNTER — OFFICE (OUTPATIENT)
Dept: URBAN - METROPOLITAN AREA CLINIC 94 | Facility: CLINIC | Age: 75
Setting detail: OPHTHALMOLOGY
End: 2025-03-18
Payer: MEDICARE

## 2025-03-18 DIAGNOSIS — H02.421: ICD-10-CM

## 2025-03-18 DIAGNOSIS — T81.31XA: ICD-10-CM

## 2025-03-18 DIAGNOSIS — H02.422: ICD-10-CM

## 2025-03-18 DIAGNOSIS — H47.233: ICD-10-CM

## 2025-03-18 PROCEDURE — 99024 POSTOP FOLLOW-UP VISIT: CPT | Performed by: OPHTHALMOLOGY

## 2025-03-18 ASSESSMENT — KERATOMETRY
OD_AXISANGLE_DEGREES: 060
OD_K1POWER_DIOPTERS: 42.75
OS_K1POWER_DIOPTERS: 43.75
OD_K2POWER_DIOPTERS: 43.75
OS_K2POWER_DIOPTERS: 44.25
OS_AXISANGLE_DEGREES: 019
METHOD_AUTO_MANUAL: AUTO

## 2025-03-18 ASSESSMENT — VISUAL ACUITY
OD_BCVA: 20/20-1
OS_BCVA: 20/50-1

## 2025-03-18 ASSESSMENT — REFRACTION_AUTOREFRACTION
OD_SPHERE: -0.50
OS_AXIS: 094
OS_CYLINDER: -1.50
OS_SPHERE: +0.25
OD_CYLINDER: -1.50
OD_AXIS: 098

## 2025-03-18 ASSESSMENT — CONFRONTATIONAL VISUAL FIELD TEST (CVF)
OS_FINDINGS: FULL
OD_FINDINGS: FULL

## 2025-03-18 ASSESSMENT — PACHYMETRY
OS_CT_CORRECTION: 2
OD_CT_CORRECTION: 2
OD_CT_UM: 515
OS_CT_UM: 519

## 2025-03-18 ASSESSMENT — TONOMETRY: OD_IOP_MMHG: 19

## 2025-03-31 NOTE — H&P PST ADULT - GASTROINTESTINAL
I spoke with Heydi at [facility] and let her know that I have faxed over Mr Colon's EKG and labs.   Hospitalist normal/soft/nontender/nondistended/normal active bowel sounds negative

## 2025-03-31 NOTE — H&P PST ADULT - HISTORY OF PRESENT ILLNESS
Narrative: 74 year old female with a PMHx of HTN, HLD, T2DM, with recent complaints of new onset SOB/MENDES and frequent palpitations when laying down at night. Patient with recent NST revealing apical ischemia. Referred for C w/ coronary angiography +/- PCI.    Review of Systems: negative unless mentioned in HPI    Symptoms:        Angina (Class): Angina Equivalent        Ischemic Symptoms: MENDES     Heart Failure:        Systolic/Diastolic/Combined: N/A       NYHA Class (within 2 weeks):     Assessment of LVEF (Must be within 6 months):       EF:  66%       Assessed by: SPECT MPI        Date: 3/6/2025    Prior Cardiac Interventions (LHC, stents, CABG): N/A        EKG:    Stress Test (Date, Findings): 3/6/2025  Abnormal exercise SPECT MPI showing a small zone of mild exercise induced ischemia of the apex. LVEF 66%.     Echo (Date, Findings): 11/11/2022  LVEF 60%   Trace MR     Antianginal Therapies:        Beta Blockers:         Calcium Channel Blockers:        Long Acting Nitrates:        Ranexa:     Associated Risk Factors:        Frailty Score: (N/A, mild, moderate, severe)       Cerebrovascular Disease: N/A       Chronic Lung Disease: N/A       Peripheral Arterial Disease: N/A       Chronic Kidney Disease (if yes, what is GFR): N/A       Uncontrolled Diabetes (if yes, what is HgbA1C or FBS): N/A       Poorly Controlled Hypertension (if yes, what is SBP): N/A       Morbid Obesity (if yes, what is BMI): N/A       History of Recent Ventricular Arrhythmia: N/A       Inability to Ambulate Safely: N/A       Need for Therapeutic Anticoagulation: N/A       Antiplatelet or Contrast Allergy: N/A    VITAL SIGNS:    PHYSICAL EXAM:  Constitutional: A & O x 3, NAD  HEENT:  Normal oral mucosa, PERRL, EOMI	  Cardiovascular: S1 S2, III/VI *** murmur, No JVD  Respiratory: Lungs clear to auscultation	  Gastrointestinal:  Soft, Non-tender, + BS	  Skin: No rashes or cyanosis  Neurologic: No deficit appreciated  Extremities: Normal range of motion, *** edema  Vascular: distal pulses +     LABS:           Narrative: 74 year old female with a PMHx of HTN, HLD, T2DM, with recent complaints of new onset SOB/MENDES and frequent palpitations when laying down at night. Patient with recent NST revealing apical ischemia. Referred for [facility] w/ coronary angiography +/- PCI.    Symptoms:        Angina (Class): Angina Equivalent        Ischemic Symptoms: MENDES     Heart Failure:        Systolic/Diastolic/Combined: N/A       NYHA Class (within 2 weeks):     Assessment of LVEF (Must be within 6 months):       EF:  66%       Assessed by: SPECT MPI        Date: 3/6/2025    Prior Cardiac Interventions (LHC, stents, CABG): N/A    Stress Test (Date, Findings): 3/6/2025  Abnormal exercise SPECT MPI showing a small zone of mild exercise induced ischemia of the apex. LVEF 66%.     Echo (Date, Findings): 11/11/2022  LVEF 60%   Trace MR     Antianginal Therapies:        Beta Blockers:  Metoprolol        Calcium Channel Blockers:        Long Acting Nitrates:        Ranexa:     Associated Risk Factors:        Frailty Score: (N/A, mild, moderate, severe) na       Cerebrovascular Disease: N/A       Chronic Lung Disease: N/A       Peripheral Arterial Disease: N/A       Chronic Kidney Disease (if yes, what is GFR): N/A       Uncontrolled Diabetes (if yes, what is HgbA1C or FBS): N/A       Poorly Controlled Hypertension (if yes, what is SBP): N/A       Morbid Obesity (if yes, what is BMI): N/A       History of Recent Ventricular Arrhythmia: N/A       Inability to Ambulate Safely: N/A       Need for Therapeutic Anticoagulation: N/A       Antiplatelet or Contrast Allergy: N/A    VITAL SIGNS:  Vital Signs Last 24 Hrs  T(C): 36.8 (01 Apr 2025 11:08), Max: 36.8 (01 Apr 2025 11:08)  T(F): 98.2 (01 Apr 2025 11:08), Max: 98.2 (01 Apr 2025 11:08)  HR: 74 (01 Apr 2025 11:08) (74 - 74)  BP: 124/80 (01 Apr 2025 11:08) (124/80 - 124/80)  BP(mean): --  RR: 16 (01 Apr 2025 11:08) (16 - 16)  SpO2: 100% (01 Apr 2025 11:08) (100% - 100%)    Parameters below as of 01 Apr 2025 11:08  Patient On (Oxygen Delivery Method): room air    LABS:                        14.3   4.94  )-----------( 98       ( 01 Apr 2025 10:28 )             42.9              Narrative: 74 year old female with a PMHx of HTN, HLD, T2DM, with recent complaints of new onset SOB/MENDES and frequent palpitations when laying down at night. Patient with recent NST revealing apical ischemia. Referred for [facility] w/ coronary angiography +/- PCI.    Symptoms:        Angina (Class): Angina Equivalent        Ischemic Symptoms: MENDES     Heart Failure:        Systolic/Diastolic/Combined: N/A       NYHA Class (within 2 weeks):     Assessment of LVEF (Must be within 6 months):       EF:  66%       Assessed by: SPECT MPI        Date: 3/6/2025    Prior Cardiac Interventions (LHC, stents, CABG): N/A    Stress Test (Date, Findings): 3/6/2025  Abnormal exercise SPECT MPI showing a small zone of mild exercise induced ischemia of the apex. LVEF 66%.     Echo (Date, Findings): 11/11/2022  LVEF 60%   Trace MR     Antianginal Therapies:        Beta Blockers:  Metoprolol        Calcium Channel Blockers:        Long Acting Nitrates:        Ranexa:     Associated Risk Factors:        Frailty Score: (N/A, mild, moderate, severe) na       Cerebrovascular Disease: N/A       Chronic Lung Disease: N/A       Peripheral Arterial Disease: N/A       Chronic Kidney Disease (if yes, what is GFR): N/A       Uncontrolled Diabetes (if yes, what is HgbA1C or FBS): N/A       Poorly Controlled Hypertension (if yes, what is SBP): N/A       Morbid Obesity (if yes, what is BMI): N/A       History of Recent Ventricular Arrhythmia: N/A       Inability to Ambulate Safely: N/A       Need for Therapeutic Anticoagulation: N/A       Antiplatelet or Contrast Allergy: N/A    VITAL SIGNS:  Vital Signs Last 24 Hrs  T(C): 36.8 (01 Apr 2025 11:08), Max: 36.8 (01 Apr 2025 11:08)  T(F): 98.2 (01 Apr 2025 11:08), Max: 98.2 (01 Apr 2025 11:08)  HR: 74 (01 Apr 2025 11:08) (74 - 74)  BP: 124/80 (01 Apr 2025 11:08) (124/80 - 124/80)  BP(mean): --  RR: 16 (01 Apr 2025 11:08) (16 - 16)  SpO2: 100% (01 Apr 2025 11:08) (100% - 100%)    Parameters below as of 01 Apr 2025 11:08  Patient On (Oxygen Delivery Method): room air    LABS:                        14.3   4.94  )-----------( 98       ( 01 Apr 2025 10:28 )             42.9         04-01    139  |  102  |  10.9  ----------------------------<  123[H]  4.6   |  24.0  |  0.52    Ca    8.9      01 Apr 2025 10:28  Mg     2.2     04-01    TPro  7.5  /  Alb  4.3  /  TBili  0.4  /  DBili  x   /  AST  36[H]  /  ALT  37[H]  /  AlkPhos  48  04-01

## 2025-03-31 NOTE — H&P PST ADULT - ASSESSMENT
74 year old female with a PMHx of HTN, HLD, T2DM, with recent complaints of new onset SOB/MENDES and frequent palpitations when laying down at night. Patient with recent NST revealing apical ischemia. Referred for [facility] w/ coronary angiography +/- PCI.    Risk Stratification:  ASA:   Mallampati:   Bleeding Risk:   Creatinine:   GFR:   Pt assessed, appropriate for sedation, pt educated regarding the plan for Versed/Fentanyl as needed.    Plan/Recommendations:   -plan for ***  -preferred access: RRA ***  -patient seen and examined  -confirmed appropriate NPO duration  -ECG and Labs reviewed  -Aspirin 81mg po pre-cath ***  -NS 250mL IV bolus pre-cath ***  -procedure discussed with patient; risks and benefits explained, questions answered  -consent obtained by attending IC    Risks, benefits, and alternatives reviewed.  Risks including but not limited to MI, death, stroke, bleeding, infection, vessel injury, hematoma, renal failure, allergic reaction, urgent open heart surgery, restenosis and stent thrombosis were reviewed.  All questions answered.  Patient is agreeable to proceed.   74 year old female with a PMHx of HTN, HLD, T2DM, with recent complaints of new onset SOB/MENDES and frequent palpitations when laying down at night. Patient with recent NST revealing apical ischemia. Referred for [facility] w/ coronary angiography +/- PCI.    Risk Stratification:  ASA: 3  Mallampati: 2  Bleeding Risk:   Creatinine:   GFR:   Pt assessed, appropriate for sedation, pt educated regarding the plan for Versed/Fentanyl as needed.    Plan/Recommendations:   -plan for [facility]  -preferred access: RRA vs LFA  -patient seen and examined  -confirmed appropriate NPO duration  -ECG and Labs reviewed  -Aspirin 81mg po pre-cath ordered  -NS 250mL IV bolus pre-cath ordered   -procedure discussed with patient; risks and benefits explained, questions answered  -consent obtained by attending IC    Risks, benefits, and alternatives reviewed.  Risks including but not limited to MI, death, stroke, bleeding, infection, vessel injury, hematoma, renal failure, allergic reaction, urgent open heart surgery, restenosis and stent thrombosis were reviewed.  All questions answered.  Patient is agreeable to proceed.   74 year old female with a PMHx of HTN, HLD, T2DM, with recent complaints of new onset SOB/MENDES and frequent palpitations when laying down at night. Patient with recent NST revealing apical ischemia. Referred for [facility] w/ coronary angiography +/- PCI.    Risk Stratification:  ASA: 3  Mallampati: 2  Bleeding Risk: 2.7  Creatinine: 0.52  GFR: 97  Pt assessed, appropriate for sedation, pt educated regarding the plan for Versed/Fentanyl as needed.    Plan/Recommendations:   -plan for [facility]  -preferred access: RRA vs LFA  -patient seen and examined  -confirmed appropriate NPO duration  -ECG and Labs reviewed  -Aspirin 81mg po pre-cath ordered  -NS 250mL IV bolus pre-cath ordered   -procedure discussed with patient; risks and benefits explained, questions answered  -consent obtained by attending IC    Risks, benefits, and alternatives reviewed.  Risks including but not limited to MI, death, stroke, bleeding, infection, vessel injury, hematoma, renal failure, allergic reaction, urgent open heart surgery, restenosis and stent thrombosis were reviewed.  All questions answered.  Patient is agreeable to proceed.

## 2025-04-01 ENCOUNTER — INPATIENT (INPATIENT)
Facility: HOSPITAL | Age: 75
LOS: 0 days | Discharge: HOME | DRG: 322 | End: 2025-04-02
Attending: INTERNAL MEDICINE | Admitting: INTERNAL MEDICINE
Payer: MEDICARE

## 2025-04-01 ENCOUNTER — TRANSCRIPTION ENCOUNTER (OUTPATIENT)
Age: 75
End: 2025-04-01

## 2025-04-01 VITALS
OXYGEN SATURATION: 100 % | RESPIRATION RATE: 16 BRPM | SYSTOLIC BLOOD PRESSURE: 124 MMHG | HEART RATE: 74 BPM | DIASTOLIC BLOOD PRESSURE: 80 MMHG | TEMPERATURE: 98 F

## 2025-04-01 DIAGNOSIS — Z98.890 OTHER SPECIFIED POSTPROCEDURAL STATES: Chronic | ICD-10-CM
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
## 2025-04-01 DIAGNOSIS — Z98.82 BREAST IMPLANT STATUS: Chronic | ICD-10-CM

## 2025-04-01 DIAGNOSIS — R94.39 ABNORMAL RESULT OF OTHER CARDIOVASCULAR FUNCTION STUDY: ICD-10-CM

## 2025-04-01 DIAGNOSIS — Z98.49 CATARACT EXTRACTION STATUS, UNSPECIFIED EYE: Chronic | ICD-10-CM

## 2025-04-01 LAB
ALBUMIN SERPL ELPH-MCNC: 4.3 G/DL — SIGNIFICANT CHANGE UP (ref 3.3–5.2)
ALP SERPL-CCNC: 48 U/L — SIGNIFICANT CHANGE UP (ref 40–120)
ALT FLD-CCNC: 37 U/L — HIGH
ANION GAP SERPL CALC-SCNC: 13 MMOL/L — SIGNIFICANT CHANGE UP (ref 5–17)
AST SERPL-CCNC: 36 U/L — HIGH
BILIRUB SERPL-MCNC: 0.4 MG/DL — SIGNIFICANT CHANGE UP (ref 0.4–2)
BUN SERPL-MCNC: 10.9 MG/DL — SIGNIFICANT CHANGE UP (ref 8–20)
CALCIUM SERPL-MCNC: 8.9 MG/DL — SIGNIFICANT CHANGE UP (ref 8.4–10.5)
CHLORIDE SERPL-SCNC: 102 MMOL/L — SIGNIFICANT CHANGE UP (ref 96–108)
CO2 SERPL-SCNC: 24 MMOL/L — SIGNIFICANT CHANGE UP (ref 22–29)
CREAT SERPL-MCNC: 0.52 MG/DL — SIGNIFICANT CHANGE UP (ref 0.5–1.3)
EGFR: 97 ML/MIN/1.73M2 — SIGNIFICANT CHANGE UP
EGFR: 97 ML/MIN/1.73M2 — SIGNIFICANT CHANGE UP
GLUCOSE BLDC GLUCOMTR-MCNC: 127 MG/DL — HIGH (ref 70–99)
GLUCOSE BLDC GLUCOMTR-MCNC: 221 MG/DL — HIGH (ref 70–99)
GLUCOSE SERPL-MCNC: 123 MG/DL — HIGH (ref 70–99)
HCT VFR BLD CALC: 42.9 % — SIGNIFICANT CHANGE UP (ref 34.5–45)
HGB BLD-MCNC: 14.3 G/DL — SIGNIFICANT CHANGE UP (ref 11.5–15.5)
MAGNESIUM SERPL-MCNC: 2.2 MG/DL — SIGNIFICANT CHANGE UP (ref 1.8–2.6)
MCHC RBC-ENTMCNC: 31.8 PG — SIGNIFICANT CHANGE UP (ref 27–34)
MCHC RBC-ENTMCNC: 33.3 G/DL — SIGNIFICANT CHANGE UP (ref 32–36)
MCV RBC AUTO: 95.3 FL — SIGNIFICANT CHANGE UP (ref 80–100)
NRBC # BLD AUTO: 0 K/UL — SIGNIFICANT CHANGE UP (ref 0–0)
NRBC # FLD: 0 K/UL — SIGNIFICANT CHANGE UP (ref 0–0)
NRBC BLD AUTO-RTO: 0 /100 WBCS — SIGNIFICANT CHANGE UP (ref 0–0)
PLATELET # BLD AUTO: 98 K/UL — LOW (ref 150–400)
PMV BLD: 13.9 FL — HIGH (ref 7–13)
POTASSIUM SERPL-MCNC: 4.6 MMOL/L — SIGNIFICANT CHANGE UP (ref 3.5–5.3)
POTASSIUM SERPL-SCNC: 4.6 MMOL/L — SIGNIFICANT CHANGE UP (ref 3.5–5.3)
PROT SERPL-MCNC: 7.5 G/DL — SIGNIFICANT CHANGE UP (ref 6.6–8.7)
RBC # BLD: 4.5 M/UL — SIGNIFICANT CHANGE UP (ref 3.8–5.2)
RBC # FLD: 12.6 % — SIGNIFICANT CHANGE UP (ref 10.3–14.5)
SODIUM SERPL-SCNC: 139 MMOL/L — SIGNIFICANT CHANGE UP (ref 135–145)
WBC # BLD: 4.94 K/UL — SIGNIFICANT CHANGE UP (ref 3.8–10.5)
WBC # FLD AUTO: 4.94 K/UL — SIGNIFICANT CHANGE UP (ref 3.8–10.5)

## 2025-04-01 PROCEDURE — 93010 ELECTROCARDIOGRAM REPORT: CPT

## 2025-04-01 RX ORDER — ASPIRIN 325 MG
1 TABLET ORAL
Refills: 0 | DISCHARGE

## 2025-04-01 RX ORDER — SITAGLIPTIN 100 MG/1
1 TABLET, FILM COATED ORAL
Refills: 0 | DISCHARGE

## 2025-04-01 RX ORDER — SODIUM CHLORIDE 9 G/1000ML
1000 INJECTION, SOLUTION INTRAVENOUS
Refills: 0 | Status: DISCONTINUED | OUTPATIENT
Start: 2025-04-01 | End: 2025-04-02

## 2025-04-01 RX ORDER — CLOPIDOGREL BISULFATE 75 MG/1
75 TABLET, FILM COATED ORAL DAILY
Refills: 0 | Status: DISCONTINUED | OUTPATIENT
Start: 2025-04-02 | End: 2025-04-02

## 2025-04-01 RX ORDER — ASPIRIN 325 MG
81 TABLET ORAL DAILY
Refills: 0 | Status: DISCONTINUED | OUTPATIENT
Start: 2025-04-01 | End: 2025-04-02

## 2025-04-01 RX ORDER — DEXTROSE 50 % IN WATER 50 %
12.5 SYRINGE (ML) INTRAVENOUS ONCE
Refills: 0 | Status: DISCONTINUED | OUTPATIENT
Start: 2025-04-01 | End: 2025-04-02

## 2025-04-01 RX ORDER — ICOSAPENT ETHYL 500 MG/1
2 CAPSULE ORAL
Refills: 0 | DISCHARGE

## 2025-04-01 RX ORDER — DEXTROSE 50 % IN WATER 50 %
15 SYRINGE (ML) INTRAVENOUS ONCE
Refills: 0 | Status: DISCONTINUED | OUTPATIENT
Start: 2025-04-01 | End: 2025-04-02

## 2025-04-01 RX ORDER — INSULIN LISPRO 100 U/ML
INJECTION, SOLUTION INTRAVENOUS; SUBCUTANEOUS
Refills: 0 | Status: DISCONTINUED | OUTPATIENT
Start: 2025-04-01 | End: 2025-04-02

## 2025-04-01 RX ORDER — GLUCAGON 3 MG/1
1 POWDER NASAL ONCE
Refills: 0 | Status: DISCONTINUED | OUTPATIENT
Start: 2025-04-01 | End: 2025-04-02

## 2025-04-01 RX ORDER — ATORVASTATIN CALCIUM 80 MG/1
40 TABLET, FILM COATED ORAL AT BEDTIME
Refills: 0 | Status: DISCONTINUED | OUTPATIENT
Start: 2025-04-01 | End: 2025-04-02

## 2025-04-01 RX ORDER — METOPROLOL SUCCINATE 50 MG/1
50 TABLET, EXTENDED RELEASE ORAL DAILY
Refills: 0 | Status: DISCONTINUED | OUTPATIENT
Start: 2025-04-01 | End: 2025-04-02

## 2025-04-01 RX ORDER — DEXTROSE 50 % IN WATER 50 %
25 SYRINGE (ML) INTRAVENOUS ONCE
Refills: 0 | Status: DISCONTINUED | OUTPATIENT
Start: 2025-04-01 | End: 2025-04-02

## 2025-04-01 RX ORDER — ALPRAZOLAM 0.5 MG
1 TABLET, EXTENDED RELEASE 24 HR ORAL DAILY
Refills: 0 | Status: DISCONTINUED | OUTPATIENT
Start: 2025-04-01 | End: 2025-04-02

## 2025-04-01 RX ORDER — ALPRAZOLAM 0.5 MG
1 TABLET, EXTENDED RELEASE 24 HR ORAL
Refills: 0 | DISCHARGE

## 2025-04-01 RX ADMIN — Medication 81 MILLIGRAM(S): at 11:40

## 2025-04-01 RX ADMIN — Medication 250 MILLILITER(S): at 11:40

## 2025-04-01 RX ADMIN — INSULIN LISPRO 4: 100 INJECTION, SOLUTION INTRAVENOUS; SUBCUTANEOUS at 17:55

## 2025-04-01 RX ADMIN — ATORVASTATIN CALCIUM 40 MILLIGRAM(S): 80 TABLET, FILM COATED ORAL at 20:08

## 2025-04-01 RX ADMIN — Medication 250 MILLILITER(S): at 14:11

## 2025-04-01 NOTE — DISCHARGE NOTE PROVIDER - NSDCFUSCHEDAPPT_GEN_ALL_CORE_FT
Nuno Lema  [facility]  [facility] [address]  Scheduled Appointment: 05/21/2025

## 2025-04-01 NOTE — ASU PATIENT PROFILE, ADULT - BLOOD TRANSFUSION, PREVIOUS, PROFILE
I'd recommend we do the xray of the sacrum area. I placed that. As for pain control we do not have a lot of options. He cannot do NSAID with heart history. If pain is too bad may need to do narcotics for period but he does not want to miss work. Would recommend PT. I will follow up after xray.    yes

## 2025-04-01 NOTE — DISCHARGE NOTE PROVIDER - NSDCCPCAREPLAN_GEN_ALL_CORE_FT
PRINCIPAL DISCHARGE DIAGNOSIS  Diagnosis: CAD (coronary artery disease)  Assessment and Plan of Treatment: Coronary artery disease is the buildup of plaque in the arteries that supply oxygen-rich blood to your heart. Plaque causes a narrowing or blockage that could result in a heart attack. Symptoms include chest pain or discomfort, shortness of breath, dizziness, palpitations, fatigue or reduced exercise tolerance. .  Go to the ED with any acute onset of chest pain, palpitations, shortness of breath or dizziness. Do NOT miss a dose or stop taking your Aspirin and Plavix, these medications keep your stent open and prevent a heart attack. If anyone tells you to stop these medications, speak to your cardiologist immediately.  Managing risk factors will help keep your stent open and prevent future blockages, risk factors may include: high blood pressure, high cholesterol, diabetes, obesity, sedentary life style and smoking.    Your diet should be low in fat, cholesterol, salt and carbohydrates, increase fruits (caution if diabetic), vegetables and whole grains/fiber rich foods.   Take all your cardiac  medications as prescribed.    Exercise is a very important factor in heart health. Once your post procedure restrictions have passed, you should engage in heart healthy, aerobic exercise. Be sure to have clearance from your cardiologist. Cardiac rehab programs could be extremely beneficial and your cardiologist could help set this up.   Follow up with your cardiologist within 1-2 weeks after your procedure.   Call your cardiologist or our unit ([phone]) with any questions or concerns that may arise.     PRINCIPAL DISCHARGE DIAGNOSIS  Diagnosis: CAD (coronary artery disease)  Assessment and Plan of Treatment: Coronary artery disease is the buildup of plaque in the arteries that supply oxygen-rich blood to your heart. Plaque causes a narrowing or blockage that could result in a heart attack. Symptoms include chest pain or discomfort, shortness of breath, dizziness, palpitations, fatigue or reduced exercise tolerance. .  Go to the ED with any acute onset of chest pain, palpitations, shortness of breath or dizziness. Do NOT miss a dose or stop taking your Aspirin and Plavix, these medications keep your stent open and prevent a heart attack. If anyone tells you to stop these medications, speak to your cardiologist immediately.  Managing risk factors will help keep your stent open and prevent future blockages, risk factors may include: high blood pressure, high cholesterol, diabetes, obesity, sedentary life style and smoking.    Your diet should be low in fat, cholesterol, salt and carbohydrates, increase fruits (caution if diabetic), vegetables and whole grains/fiber rich foods.   Take all your cardiac  medications as prescribed.    Exercise is a very important factor in heart health. Once your post procedure restrictions have passed, you should engage in heart healthy, aerobic exercise. Be sure to have clearance from your cardiologist. Cardiac rehab programs could be extremely beneficial and your cardiologist could help set this up.   Follow up with your cardiologist within 1-2 weeks after your procedure.   Call your cardiologist or our unit ([phone]) with any questions or concerns that may arise.      SECONDARY DISCHARGE DIAGNOSES  Diagnosis: DM (diabetes mellitus)  Assessment and Plan of Treatment: A1C with Estimated Average Glucose Result: 6.1 % (04.02.25 @ 05:14)  -Please hold your metformin x 48 hours. You may resume tomorrow on 4/3 AM  -Please continue your Januvia

## 2025-04-01 NOTE — DISCHARGE NOTE PROVIDER - NSDCMRMEDTOKEN_GEN_ALL_CORE_FT
ALPRAZolam 1 mg oral tablet: 1 tab(s) orally once a day  aspirin 81 mg oral tablet, chewable: 1 tab(s) chewed once a day  Icosapent Ethyl 1 g oral capsule: 2 cap(s) orally 4 times a day  Januvia 100 mg oral tablet: 1 tab(s) orally once a day  MetFORMIN (Eqv-Fortamet) 500 mg oral tablet, extended release: one tab orally twice a day   metoprolol succinate 50 mg oral tablet, extended release: 1 tab(s) orally once a day, in AM   ALPRAZolam 1 mg oral tablet: 1 tab(s) orally once a day  aspirin 81 mg oral tablet, chewable: 1 tab(s) chewed once a day  clopidogrel 75 mg oral tablet: 1 tab(s) orally once a day  Icosapent Ethyl 1 g oral capsule: 2 cap(s) orally 4 times a day  Januvia 100 mg oral tablet: 1 tab(s) orally once a day  metFORMIN 500 mg oral tablet, extended release: 1 tab(s) orally 2 times a day please hold x 24 more hours. Ok to resume 4/3 AM  metoprolol succinate 50 mg oral tablet, extended release: 1 tab(s) orally once a day take in PM

## 2025-04-01 NOTE — DISCHARGE NOTE PROVIDER - NSDCCPTREATMENT_GEN_ALL_CORE_FT
PRINCIPAL PROCEDURE  Procedure: Left heart cardiac cath  Findings and Treatment: Restricted use with no heavy lifting of affected arm for 5 days  No submerging the arm in water for 5 days  You may start showering today.  Call your doctor for any bleeding, swelling, loss of sensation in the hand or fingers, or fingers turning blue.  If heavy bleeding or large lumps form, hold pressure at the spot and come to the Emergency Room.       PRINCIPAL PROCEDURE  Procedure: Left heart cardiac cath  Findings and Treatment: -You had a cardiac catheterization with Dr. Radford on 4/2/25. Dr Crow accessed your right radial artery during the procedure. That is the artery in your right wrist.   -Please continue to monitor your right wrist when you go home. If you develop any bleeding, lay down where you are and apply direct firm pressure until the bleeding stops. If the bleeding is excessive, please call 911.   -If heavy bleeding or large lumps form, hold pressure at the spot and come to the Emergency Room.  -No submerging the arm in water for 48 hours. This includes hot tubs, swimming pools and jacuzzis.  You may start showering today. Gently cleanse your right wrist with water and soap. Pat dry with towel. You may place a bandaid over the site. change the bandaid every day.   -Restricted use with no heavy lifting of affected arm for 5 days. No heavy lifting greater than 5 lbs.  -You may walk indoors/ outdoors as tolerated. No strenuous exercise, gym, sports or heavy lifting x5 days.  -Please return to nearest ED if you develop any fever, chills, drainage from the incision site, or any change of temperature, color, sensation of the affected extremity.  -Call your doctor for any bleeding, swelling, loss of sensation in the hand or fingers, or fingers turning blue.  -Please return to nearest ED if you develop any chest pain, chest pressure, shortness of breath, jaw pain, pain radiating down the arm, palpitations, dizziness, palpitations, abdominal complaints including abdominal pain, nausea and vomiting.   -Please follow up with your cardiologist in 1-2 weeks

## 2025-04-01 NOTE — CHART NOTE - NSCHARTNOTEFT_GEN_A_CORE
Now s/p LHC via  RRA/RFA with  ************. Pt tolerated procedure well. Pt arrived to recovery in NAD and HDS.  Access site stable, no bleed/hematoma, distal pulse +.  Denies complaints of chest pain, SOB, dizziness, or palpitations    Intraprocedural findings    Stents:    Medications  Versed:  Fentanyl:  Heparin:  Plavix/Brillinta:   Omnipaque:    Closure Device:    Post Cath EKG:    ALLERGIES:   No Known Drug Allergies  pollen (Rhinitis)      PHYSICAL EXAM:  Constitutional: Comfortable . No acute distress.   CNS: A&O for 3. No focal deficits.   Respiratory: CTAB, unlabored   Cardiovascular: RRR normal s1 s2. No murmur. No gallop.  Gastrointestinal: Soft, non-tender. +Bowel sounds.   Extremities: 2+ Peripheral Pulses, No  edema  Groin: R/L + Benign, no hematoma, no bleeding.******************  Wrist: R/L + Benign, no hematoma, no bleeding. ******************  Psychiatric: Calm . no agitation.   Skin: Warm and dry.    VITAL SIGNS:   T(C): 36.8 (04-01-25 @ 11:08), Max: 36.8 (04-01-25 @ 11:08)  T(F): 98.2 (04-01-25 @ 11:08), Max: 98.2 (04-01-25 @ 11:08)  HR: 59 (04-01-25 @ 13:40) (59 - 74)  BP: 117/71 (04-01-25 @ 13:40) (117/71 - 124/80)  RR: 16 (04-01-25 @ 13:40) (16 - 16)  SpO2: 98% (04-01-25 @ 13:40) (98% - 100%)    s/p Cath: pt is now s/p LHC/RHC via RRA/RFA approach with  ******************    Plan:  -Formal cath report pending  -Post procedure management/monitoring per protocol  -Radial compression band removal at ***  -Bedrest for ***hours post procedure  -Groin/Radial precautions discussed with patient  -Labs and EKG in am  -NS 0.9% 250ml/hr for 1 bolus: post procedure BK ppx   -Repeat ECG if any clinical indication or change on tele  -Continue current medical therapy  GDMT- Continue/Start   -Dual anti platelet therapy with Aspirin/Plavix *******.Educated regarding strict adherence with DAPT   - BB:  - Statin:  - ACE/ARB  - HOLD METFORMIN POST CATH 2 DAYS THEN RESUME AS USUAL DOSING.   -Educated regarding post procedure management and care  -Discussed the importance of RF modification  -Cardiac rehab info provided/referral and communication to cardiac rehab completed  -F/U outpt in 1-2 weeks with Cardiologist  ***  - Plan for D/C patient tomorrow am / this afternoon ************ if remains HDS, ECG and labs in am stable and without complications    Case discussed with      PLEASE DO NOT ADMINISTER BLOOD TRANSFUSION ON THIS PATIENT WITHIN 72 HOURS OF CARDIAC CATHERIZATION (UNLESS PATIENT IS HEMODYNAMICALLY UNSTABLE OR ACTIVELY BLEEDING) WITHOUT FIRST DISCUSSING WITH INTERVENTIONALIST  ______________ ON TEAMS OR CALLING CATH LAB HOLDING [phone] Now s/p LHC via  RRA with Dr. Radford. Pt tolerated procedure well. Pt arrived to recovery in NAD and HDS.  Access site stable, no bleed/hematoma, distal pulse +.  Denies complaints of chest pain, SOB, dizziness, or palpitations    Intraprocedural findings    Stents:    Medications  Versed: 2 mg  Fentanyl: 110 mcg  Heparin: 8000 units  Plavix: 600 mg  Omnipaque:     Closure Device:    Post Cath EKG:    ALLERGIES:   No Known Drug Allergies  pollen (Rhinitis)      PHYSICAL EXAM:  Constitutional: Comfortable . No acute distress.   CNS: A&O for 3. No focal deficits.   Respiratory: CTAB, unlabored   Cardiovascular: RRR normal s1 s2. No murmur. No gallop.  Gastrointestinal: Soft, non-tender. +Bowel sounds.   Extremities: 2+ Peripheral Pulses, No  edema  Groin: R/L + Benign, no hematoma, no bleeding.******************  Wrist: R/L + Benign, no hematoma, no bleeding. ******************  Psychiatric: Calm . no agitation.   Skin: Warm and dry.    VITAL SIGNS:   T(C): 36.8 (04-01-25 @ 11:08), Max: 36.8 (04-01-25 @ 11:08)  T(F): 98.2 (04-01-25 @ 11:08), Max: 98.2 (04-01-25 @ 11:08)  HR: 59 (04-01-25 @ 13:40) (59 - 74)  BP: 117/71 (04-01-25 @ 13:40) (117/71 - 124/80)  RR: 16 (04-01-25 @ 13:40) (16 - 16)  SpO2: 98% (04-01-25 @ 13:40) (98% - 100%)    s/p Cath: pt is now s/p LHC/RHC via RRA/RFA approach with  ******************    Plan:  -Formal cath report pending  -Post procedure management/monitoring per protocol  -Radial compression band removal at ***  -Bedrest for ***hours post procedure  -Groin/Radial precautions discussed with patient  -Labs and EKG in am  -NS 0.9% 250ml/hr for 1 bolus: post procedure BK ppx   -Repeat ECG if any clinical indication or change on tele  -Continue current medical therapy  GDMT- Continue/Start   -Dual anti platelet therapy with Aspirin/Plavix *******.Educated regarding strict adherence with DAPT   - BB:  - Statin:  - ACE/ARB  - HOLD METFORMIN POST CATH 2 DAYS THEN RESUME AS USUAL DOSING.   -Educated regarding post procedure management and care  -Discussed the importance of RF modification  -Cardiac rehab info provided/referral and communication to cardiac rehab completed  -F/U outpt in 1-2 weeks with Cardiologist  ***  - Plan for D/C patient tomorrow am / this afternoon ************ if remains HDS, ECG and labs in am stable and without complications    Case discussed with      PLEASE DO NOT ADMINISTER BLOOD TRANSFUSION ON THIS PATIENT WITHIN 72 HOURS OF CARDIAC CATHERIZATION (UNLESS PATIENT IS HEMODYNAMICALLY UNSTABLE OR ACTIVELY BLEEDING) WITHOUT FIRST DISCUSSING WITH INTERVENTIONALIST  ______________ ON TEAMS OR CALLING CATH LAB HOLDING -[phone] Now s/p LHC via  RRA with Dr. Radford. Pt tolerated procedure well. Pt arrived to recovery in NAD and HDS.  Access site stable, no bleed/hematoma, distal pulse +.  Denies complaints of chest pain, SOB, dizziness, or palpitations    Intraprocedural findings  mLAD DUONG x1    Stents:  Orsiro [place] 5.5x26 mm.    Medications  Versed: 2 mg  Fentanyl: 110 mcg  Heparin: 8000 units  Plavix: 600 mg  Omnipaque: 79 ml    Closure Device: RRA band    Post Cath EKG:    ALLERGIES:   No Known Drug Allergies  pollen (Rhinitis)      PHYSICAL EXAM:  Constitutional: Comfortable . No acute distress.   CNS: A&O for 3. No focal deficits.   Respiratory: CTAB, unlabored   Cardiovascular: RRR normal s1 s2. No murmur. No gallop.  Gastrointestinal: Soft, non-tender. +Bowel sounds.   Extremities: 2+ Peripheral Pulses, No  edema  Groin: R/L + Benign, no hematoma, no bleeding.******************  Wrist: R/L + Benign, no hematoma, no bleeding. ******************  Psychiatric: Calm . no agitation.   Skin: Warm and dry.    VITAL SIGNS:   T(C): 36.8 (04-01-25 @ 11:08), Max: 36.8 (04-01-25 @ 11:08)  T(F): 98.2 (04-01-25 @ 11:08), Max: 98.2 (04-01-25 @ 11:08)  HR: 59 (04-01-25 @ 13:40) (59 - 74)  BP: 117/71 (04-01-25 @ 13:40) (117/71 - 124/80)  RR: 16 (04-01-25 @ 13:40) (16 - 16)  SpO2: 98% (04-01-25 @ 13:40) (98% - 100%)    s/p Cath: pt is now s/p LHC/RHC via RRA/RFA approach with  ******************    Plan:  -Formal cath report pending  -Post procedure management/monitoring per protocol  -Radial compression band removal at ***  -Bedrest for ***hours post procedure  -Groin/Radial precautions discussed with patient  -Labs and EKG in am  -NS 0.9% 250ml/hr for 1 bolus: post procedure BK ppx   -Repeat ECG if any clinical indication or change on tele  -Continue current medical therapy  GDMT- Continue/Start   -Dual anti platelet therapy with Aspirin/Plavix *******.Educated regarding strict adherence with DAPT   - BB:  - Statin:  - ACE/ARB  - HOLD METFORMIN POST CATH 2 DAYS THEN RESUME AS USUAL DOSING.   -Educated regarding post procedure management and care  -Discussed the importance of RF modification  -Cardiac rehab info provided/referral and communication to cardiac rehab completed  -F/U outpt in 1-2 weeks with Cardiologist  ***  - Plan for D/C patient tomorrow am / this afternoon ************ if remains HDS, ECG and labs in am stable and without complications    Case discussed with Dr     PLEASE DO NOT ADMINISTER BLOOD TRANSFUSION ON THIS PATIENT WITHIN 72 HOURS OF CARDIAC CATHERIZATION (UNLESS PATIENT IS HEMODYNAMICALLY UNSTABLE OR ACTIVELY BLEEDING) WITHOUT FIRST DISCUSSING WITH INTERVENTIONALIST  ______________ ON TEAMS OR CALLING CATH LAB HOLDING [phone] Now s/p LHC via  RRA with Dr. Radford. Pt tolerated procedure well. Pt arrived to recovery in NAD and HDS.  Access site stable, no bleed/hematoma, distal pulse +.  Denies complaints of chest pain, SOB, dizziness, or palpitations    Intraprocedural findings  mLAD DUONG x1    Stents:  Orsiro [place] 5.5x26 mm.    Medications  Versed: 2 mg  Fentanyl: 110 mcg  Heparin: 8000 units  Plavix: 600 mg  Omnipaque: 79 ml    Closure Device: RRA band    Post Cath EKG: Sinus bradycardia (59 BPM). No acute changes     ALLERGIES:   No Known Drug Allergies  pollen (Rhinitis)    PHYSICAL EXAM:  Constitutional: Comfortable . No acute distress.   CNS: A&O for 3. No focal deficits.   Respiratory: CTAB, unlabored   Cardiovascular: RRR normal s1 s2. No murmur. No gallop.  Gastrointestinal: Soft, non-tender. +Bowel sounds.   Extremities: 2+ Peripheral Pulses, No  edema  Wrist: R + Benign, no hematoma, no bleeding.   Psychiatric: Calm . no agitation.   Skin: Warm and dry.    VITAL SIGNS:   T(C): 36.8 (04-01-25 @ 11:08), Max: 36.8 (04-01-25 @ 11:08)  T(F): 98.2 (04-01-25 @ 11:08), Max: 98.2 (04-01-25 @ 11:08)  HR: 59 (04-01-25 @ 13:40) (59 - 74)  BP: 117/71 (04-01-25 @ 13:40) (117/71 - 124/80)  RR: 16 (04-01-25 @ 13:40) (16 - 16)  SpO2: 98% (04-01-25 @ 13:40) (98% - 100%)    s/p Cath: pt is now s/p LHC via  RRA with Dr. Radford.   Intraprocedural findings  mLAD DUONG x1    Plan:  -Formal cath report pending  -Post procedure management/monitoring per protocol  -Radial compression band removal at 15.15  -Bedrest for 2 hours post procedure  -Radial precautions discussed with patient  -Labs and EKG in am  -NS 0.9% 250ml/hr for 1 bolus: post procedure BK ppx   -Repeat ECG if any clinical indication or change on tele  -Continue current medical therapy  -Dual anti platelet therapy with Aspirin 81 mg Plavix 75 mg. Educated regarding strict adherence with DAPT   metoprolol succinate 50 mg oral tablet, extended release: 1 tab(s) orally once a day, in AM (01 Apr 2025 11:13)  - Lipitor /*/*/*/*/*/*  - HOLD METFORMIN POST CATH 2 DAYS THEN RESUME AS USUAL DOSING.   -Educated regarding post procedure management and care  -Discussed the importance of RF modification  -Cardiac rehab info provided/referral and communication to cardiac rehab completed  -F/U outpt in 1-2 weeks with Cardiologist Dr. Radford   - Plan for D/C patient tomorrow am  if remains HDS, ECG and labs in am stable and without complications    Case discussed with Dr Radford    PLEASE DO NOT ADMINISTER BLOOD TRANSFUSION ON THIS PATIENT WITHIN 72 HOURS OF CARDIAC CATHERIZATION (UNLESS PATIENT IS HEMODYNAMICALLY UNSTABLE OR ACTIVELY BLEEDING) WITHOUT FIRST DISCUSSING WITH INTERVENTIONALIST DR. Radford ON TEAMS OR CALLING CATH LAB HOLDING [phone] Now s/p LHC via  RRA with Dr. Radford. Pt tolerated procedure well. Pt arrived to recovery in NAD and HDS.  Access site stable, no bleed/hematoma, distal pulse +.  Denies complaints of chest pain, SOB, dizziness, or palpitations    Intraprocedural findings  mLAD DUONG x1    Stents:  Orsiro [place] 5.5x26 mm.    Medications  Versed: 2 mg  Fentanyl: 110 mcg  Heparin: 8000 units  Plavix: 600 mg  Omnipaque: 79 ml    Closure Device: RRA band    Post Cath EKG: Sinus bradycardia (59 BPM). No acute changes     ALLERGIES:   No Known Drug Allergies  pollen (Rhinitis)    PHYSICAL EXAM:  Constitutional: Comfortable . No acute distress.   CNS: A&O for 3. No focal deficits.   Respiratory: CTAB, unlabored   Cardiovascular: RRR normal s1 s2. No murmur. No gallop.  Gastrointestinal: Soft, non-tender. +Bowel sounds.   Extremities: 2+ Peripheral Pulses, No  edema  Wrist: R + Benign, no hematoma, no bleeding.   Psychiatric: Calm . no agitation.   Skin: Warm and dry.    VITAL SIGNS:   T(C): 36.8 (04-01-25 @ 11:08), Max: 36.8 (04-01-25 @ 11:08)  T(F): 98.2 (04-01-25 @ 11:08), Max: 98.2 (04-01-25 @ 11:08)  HR: 59 (04-01-25 @ 13:40) (59 - 74)  BP: 117/71 (04-01-25 @ 13:40) (117/71 - 124/80)  RR: 16 (04-01-25 @ 13:40) (16 - 16)  SpO2: 98% (04-01-25 @ 13:40) (98% - 100%)    s/p Cath: pt is now s/p LHC via  RRA with Dr. Radford.   Intraprocedural findings  mLAD DUONG x1    Plan:  -Formal cath report pending  -Post procedure management/monitoring per protocol  -Radial compression band removal at 15.15  -Bedrest for 2 hours post procedure  -Radial precautions discussed with patient  -Labs and EKG in am  -NS 0.9% 250ml/hr for 1 bolus: post procedure BK ppx   -Repeat ECG if any clinical indication or change on tele  -Continue current medical therapy  -Dual anti platelet therapy with Aspirin 81 mg Plavix 75 mg. Educated regarding strict adherence with DAPT   metoprolol succinate 50 mg oral tablet, extended release: 1 tab(s) orally once a day, in AM (01 Apr 2025 11:13)  - pt will continue with Icosapent Ethyl at home   - HOLD METFORMIN POST CATH 2 DAYS THEN RESUME AS USUAL DOSING.   -Educated regarding post procedure management and care  -Discussed the importance of RF modification  -Cardiac rehab info provided/referral and communication to cardiac rehab completed  -F/U outpt in 1-2 weeks with Cardiologist Dr. Radford   - Plan for D/C patient tomorrow am  if remains HDS, ECG and labs in am stable and without complications    Case discussed with Dr Radford    PLEASE DO NOT ADMINISTER BLOOD TRANSFUSION ON THIS PATIENT WITHIN 72 HOURS OF CARDIAC CATHERIZATION (UNLESS PATIENT IS HEMODYNAMICALLY UNSTABLE OR ACTIVELY BLEEDING) WITHOUT FIRST DISCUSSING WITH INTERVENTIONALIST DR. Radford ON TEAMS OR CALLING CATH LAB HOLDING [phone] Now s/p LHC via  RRA with Dr. Radford. Pt tolerated procedure well. Pt arrived to recovery in NAD and HDS.  Access site stable, no bleed/hematoma, distal pulse +.  Denies complaints of chest pain, SOB, dizziness, or palpitations    Intraprocedural findings  mLAD DUONG x1    Stents:  Orsiro [place] 5.5x26 mm.    Medications  Versed: 2 mg  Fentanyl: 110 mcg  Heparin: 8000 units  Plavix: 600 mg  Omnipaque: 79 ml    Closure Device: RRA band    Post Cath EKG: Sinus bradycardia (59 BPM). No acute changes     ALLERGIES:   No Known Drug Allergies  pollen (Rhinitis)    PHYSICAL EXAM:  Constitutional: Comfortable . No acute distress.   CNS: A&O for 3. No focal deficits.   Respiratory: CTAB, unlabored   Cardiovascular: RRR normal s1 s2. No murmur. No gallop.  Gastrointestinal: Soft, non-tender. +Bowel sounds.   Extremities: 2+ Peripheral Pulses, No  edema  Wrist: R + Benign, no hematoma, no bleeding.   Psychiatric: Calm . no agitation.   Skin: Warm and dry.    VITAL SIGNS:   T(C): 36.8 (04-01-25 @ 11:08), Max: 36.8 (04-01-25 @ 11:08)  T(F): 98.2 (04-01-25 @ 11:08), Max: 98.2 (04-01-25 @ 11:08)  HR: 59 (04-01-25 @ 13:40) (59 - 74)  BP: 117/71 (04-01-25 @ 13:40) (117/71 - 124/80)  RR: 16 (04-01-25 @ 13:40) (16 - 16)  SpO2: 98% (04-01-25 @ 13:40) (98% - 100%)    s/p Cath: pt is now s/p LHC via  RRA with Dr. Radford.   Intraprocedural findings  mLAD DUONG x1    Plan:  -Formal cath report pending  -Post procedure management/monitoring per protocol  -Radial compression band removal at 15.15  -Bedrest for 2 hours post procedure  -Radial precautions discussed with patient  -Labs and EKG in am  -NS 0.9% 250ml/hr for 1 bolus: post procedure BK ppx   -Repeat ECG if any clinical indication or change on tele  -Continue current medical therapy  -Dual anti platelet therapy with Aspirin 81 mg Plavix 75 mg. Educated regarding strict adherence with DAPT   metoprolol succinate 50 mg oral tablet, extended release: 1 tab(s) orally once a day, in AM (01 Apr 2025 11:13)  - pt will receive Lipitor 40 md QD while in the hospital and continue with Icosapent Ethyl at home   - HOLD METFORMIN POST CATH 2 DAYS THEN RESUME AS USUAL DOSING.   -Educated regarding post procedure management and care  -Discussed the importance of RF modification  -Cardiac rehab info provided/referral and communication to cardiac rehab completed  -F/U outpt in 1-2 weeks with Cardiologist Dr. Radford   - Plan for D/C patient tomorrow am  if remains HDS, ECG and labs in am stable and without complications    Case discussed with Dr Radford    PLEASE DO NOT ADMINISTER BLOOD TRANSFUSION ON THIS PATIENT WITHIN 72 HOURS OF CARDIAC CATHERIZATION (UNLESS PATIENT IS HEMODYNAMICALLY UNSTABLE OR ACTIVELY BLEEDING) WITHOUT FIRST DISCUSSING WITH INTERVENTIONALIST DR. Radford ON TEAMS OR CALLING CATH LAB HOLDING -[phone]

## 2025-04-01 NOTE — DISCHARGE NOTE PROVIDER - HOSPITAL COURSE
74 year old female with a PMHx of HTN, HLD, T2DM, with recent complaints of new onset SOB/MENDES and frequent palpitations when laying down at night. Patient with recent NST revealing apical ischemia. Referred for [facility] w/ coronary angiography +/- PCI.    s/p Cath: pt is now s/p C via  RRA with Dr. Radford.   Intraprocedural findings  mLAD DUONG x1    Plan:  -Formal cath report pending  -Radial precautions discussed with patient  -Continue current medical therapy  -Dual anti platelet therapy with Aspirin 81 mg Plavix 75 mg. Educated regarding strict adherence with DAPT   metoprolol succinate 50 mg oral tablet, extended release: 1 tab(s) orally once a day, in AM (01 Apr 2025 11:13)  - Lipitor /*/*/*/*/*/*  - HOLD METFORMIN POST CATH 2 DAYS THEN RESUME AS USUAL DOSING.   -Educated regarding post procedure management and care  -Discussed the importance of RF modification  -Cardiac rehab info provided/referral and communication to cardiac rehab completed  -F/U outpt in 1-2 weeks with Cardiologist Dr. Radford   - Plan for D/C patient tomorrow am  if remains HDS, ECG and labs in am stable and without complications    Case discussed with Dr Radford   74 year old female with a PMHx of HTN, HLD, T2DM, with recent complaints of new onset SOB/MENDES and frequent palpitations when laying down at night. Patient with recent NST revealing apical ischemia. Referred for [facility] w/ coronary angiography +/- PCI.    s/p Cath: pt is now s/p C via  RRA with Dr. Radford.   Intraprocedural findings  mLAD DUONG x1    Plan:  -Formal cath report pending  -Radial precautions discussed with patient  -Continue current medical therapy  -Dual anti platelet therapy with Aspirin 81 mg Plavix 75 mg. Educated regarding strict adherence with DAPT   metoprolol succinate 50 mg oral tablet, extended release: 1 tab(s) orally once a day, in AM (01 Apr 2025 11:13)  -patient has concerns of starting statin. Patient to follow up with Dr. Radford in 1 week and will discuss with her  - HOLD METFORMIN POST CATH 2 DAYS THEN RESUME AS USUAL DOSING.   -Educated regarding post procedure management and care  -Discussed the importance of RF modification  -Cardiac rehab info provided/referral and communication to cardiac rehab completed  -F/U outpt in 1-2 weeks with Cardiologist Dr. Radford   - Plan for D/C patient tomorrow am  if remains HDS, ECG and labs in am stable and without complications    Case discussed with Dr Radford

## 2025-04-01 NOTE — DISCHARGE NOTE PROVIDER - CARE PROVIDER_API CALL
Orlin Radford  Interventional Cardiology  [address] [place] [address], [address]  [place] [address]  Phone: [phone]  Fax: [phone]  Follow Up Time: 1 week

## 2025-04-02 ENCOUNTER — TRANSCRIPTION ENCOUNTER (OUTPATIENT)
Age: 75
End: 2025-04-02

## 2025-04-02 VITALS
DIASTOLIC BLOOD PRESSURE: 82 MMHG | OXYGEN SATURATION: 96 % | RESPIRATION RATE: 18 BRPM | HEART RATE: 80 BPM | SYSTOLIC BLOOD PRESSURE: 137 MMHG | TEMPERATURE: 98 F

## 2025-04-02 LAB
A1C WITH ESTIMATED AVERAGE GLUCOSE RESULT: 6.1 % — HIGH (ref 4–5.6)
ALBUMIN SERPL ELPH-MCNC: 3.9 G/DL — SIGNIFICANT CHANGE UP (ref 3.3–5.2)
ALP SERPL-CCNC: 49 U/L — SIGNIFICANT CHANGE UP (ref 40–120)
ALT FLD-CCNC: 35 U/L — HIGH
ANION GAP SERPL CALC-SCNC: 15 MMOL/L — SIGNIFICANT CHANGE UP (ref 5–17)
AST SERPL-CCNC: 25 U/L — SIGNIFICANT CHANGE UP
BILIRUB SERPL-MCNC: 0.4 MG/DL — SIGNIFICANT CHANGE UP (ref 0.4–2)
BUN SERPL-MCNC: 11.6 MG/DL — SIGNIFICANT CHANGE UP (ref 8–20)
CALCIUM SERPL-MCNC: 8.9 MG/DL — SIGNIFICANT CHANGE UP (ref 8.4–10.5)
CHLORIDE SERPL-SCNC: 103 MMOL/L — SIGNIFICANT CHANGE UP (ref 96–108)
CO2 SERPL-SCNC: 21 MMOL/L — LOW (ref 22–29)
CREAT SERPL-MCNC: 0.46 MG/DL — LOW (ref 0.5–1.3)
EGFR: 100 ML/MIN/1.73M2 — SIGNIFICANT CHANGE UP
EGFR: 100 ML/MIN/1.73M2 — SIGNIFICANT CHANGE UP
ESTIMATED AVERAGE GLUCOSE: 128 MG/DL — HIGH (ref 68–114)
GLUCOSE BLDC GLUCOMTR-MCNC: 145 MG/DL — HIGH (ref 70–99)
GLUCOSE SERPL-MCNC: 137 MG/DL — HIGH (ref 70–99)
HCT VFR BLD CALC: 39.7 % — SIGNIFICANT CHANGE UP (ref 34.5–45)
HGB BLD-MCNC: 13.5 G/DL — SIGNIFICANT CHANGE UP (ref 11.5–15.5)
IMMATURE PLATELET FRACTION #: 12.2 K/UL — HIGH (ref 4.7–11.1)
IMMATURE PLATELET FRACTION %: 12.2 % — HIGH (ref 1.6–4.9)
MAGNESIUM SERPL-MCNC: 2.1 MG/DL — SIGNIFICANT CHANGE UP (ref 1.6–2.6)
MCHC RBC-ENTMCNC: 32 PG — SIGNIFICANT CHANGE UP (ref 27–34)
MCHC RBC-ENTMCNC: 34 G/DL — SIGNIFICANT CHANGE UP (ref 32–36)
MCV RBC AUTO: 94.1 FL — SIGNIFICANT CHANGE UP (ref 80–100)
NRBC # BLD AUTO: 0 K/UL — SIGNIFICANT CHANGE UP (ref 0–0)
NRBC # FLD: 0 K/UL — SIGNIFICANT CHANGE UP (ref 0–0)
NRBC BLD AUTO-RTO: 0 /100 WBCS — SIGNIFICANT CHANGE UP (ref 0–0)
PLATELET # BLD AUTO: 100 K/UL — LOW (ref 150–400)
PMV BLD: 13.9 FL — HIGH (ref 7–13)
POTASSIUM SERPL-MCNC: 4.3 MMOL/L — SIGNIFICANT CHANGE UP (ref 3.5–5.3)
POTASSIUM SERPL-SCNC: 4.3 MMOL/L — SIGNIFICANT CHANGE UP (ref 3.5–5.3)
PROT SERPL-MCNC: 6.9 G/DL — SIGNIFICANT CHANGE UP (ref 6.6–8.7)
RBC # BLD: 4.22 M/UL — SIGNIFICANT CHANGE UP (ref 3.8–5.2)
RBC # FLD: 12.2 % — SIGNIFICANT CHANGE UP (ref 10.3–14.5)
SODIUM SERPL-SCNC: 139 MMOL/L — SIGNIFICANT CHANGE UP (ref 135–145)
WBC # BLD: 5.42 K/UL — SIGNIFICANT CHANGE UP (ref 3.8–10.5)
WBC # FLD AUTO: 5.42 K/UL — SIGNIFICANT CHANGE UP (ref 3.8–10.5)

## 2025-04-02 PROCEDURE — C1753: CPT

## 2025-04-02 PROCEDURE — 93010 ELECTROCARDIOGRAM REPORT: CPT

## 2025-04-02 PROCEDURE — C9600: CPT | Mod: LD

## 2025-04-02 PROCEDURE — 93458 L HRT ARTERY/VENTRICLE ANGIO: CPT | Mod: 59

## 2025-04-02 PROCEDURE — 82962 GLUCOSE BLOOD TEST: CPT

## 2025-04-02 PROCEDURE — 80053 COMPREHEN METABOLIC PANEL: CPT

## 2025-04-02 PROCEDURE — C1894: CPT

## 2025-04-02 PROCEDURE — 92978 ENDOLUMINL IVUS OCT C 1ST: CPT | Mod: LD

## 2025-04-02 PROCEDURE — 83036 HEMOGLOBIN GLYCOSYLATED A1C: CPT

## 2025-04-02 PROCEDURE — C1874: CPT

## 2025-04-02 PROCEDURE — 93005 ELECTROCARDIOGRAM TRACING: CPT

## 2025-04-02 PROCEDURE — 85027 COMPLETE CBC AUTOMATED: CPT

## 2025-04-02 PROCEDURE — 36415 COLL VENOUS BLD VENIPUNCTURE: CPT

## 2025-04-02 PROCEDURE — C1769: CPT

## 2025-04-02 PROCEDURE — 83735 ASSAY OF MAGNESIUM: CPT

## 2025-04-02 PROCEDURE — C1887: CPT

## 2025-04-02 PROCEDURE — C1725: CPT

## 2025-04-02 RX ORDER — METFORMIN HYDROCHLORIDE 850 MG/1
1 TABLET ORAL
Qty: 0 | Refills: 0 | DISCHARGE
Start: 2025-04-02

## 2025-04-02 RX ORDER — CLOPIDOGREL BISULFATE 75 MG/1
1 TABLET, FILM COATED ORAL
Qty: 90 | Refills: 3
Start: 2025-04-02 | End: 2026-03-27

## 2025-04-02 RX ADMIN — CLOPIDOGREL BISULFATE 75 MILLIGRAM(S): 75 TABLET, FILM COATED ORAL at 11:20

## 2025-04-02 RX ADMIN — Medication 81 MILLIGRAM(S): at 11:20

## 2025-04-02 RX ADMIN — METOPROLOL SUCCINATE 50 MILLIGRAM(S): 50 TABLET, EXTENDED RELEASE ORAL at 05:04

## 2025-04-02 NOTE — PROGRESS NOTE ADULT - ASSESSMENT
POD 1 LHC via RRA revealing 75% stenosis of pLAD at D1 s/p 1 DUONG. Patient reports having some palpitations overnight at 1 AM. TELE reviewed and no afib seen. Patient in SR at this time with no complaints. She denies chest pain, chest pressure, shortness of breath, no current palpitations and dizziness. RRA remains stable w/o active bleeding or wrist hematoma. RUE/ right hand remains acyanotic; warm to touch; motor/sensory function intact; 2+ right radial pulse.     PLAN:  - miguel ID [id number] used for interpretation  -Post procedure management/monitoring per protocol  -Access site precautions discussed with patient using   -Labs and EKG in am  -Repeat ECG if any clinical indication or change on tele  -Continue current medical therapy  -Dual anti platelet therapy with aspirin/plavix   -Cont BB with Toprol 50mg po daily  -Cont vascepa. patient concerned about starting statin therapy and wishes to discuss with dr. radford in office  -d/w dr. radford, patient will have cardiac monitor sent to home regarding complaints of palpitations  -Educated regarding strict adherence with DAPT   -hold metformin x 24 more hours. ok to resume thursday 4/3 AM  -Educated regarding post procedure management and care  -Discussed the importance of RF modification  -Cardiac rehab info provided/referral and communication to cardiac rehab completed  -F/U outpt in 1-2 weeks with Cardiologist Dr. dr. radford  -DISPO: Plan for D/C in am if remains HDS, ECG and labs in am stable and without complications    PLEASE DO NOT ADMINISTER BLOOD TRANSFUSION ON THIS PATIENT WITHIN 72 HOURS OF CARDIAC CATHERIZATION (UNLESS PATIENT IS HEMODYNAMICALLY UNSTABLE OR ACTIVELY BLEEDING) WITHOUT FIRST DISCUSSING WITH INTERVENTIONALIST DR. Radford ON TEAMS OR CALLING CATH LAB HOLDING -[phone].

## 2025-04-02 NOTE — DISCHARGE NOTE NURSING/CASE MANAGEMENT/SOCIAL WORK - NSDCPEFALRISK_GEN_ALL_CORE
For information on Fall & Injury Prevention, visit: [email or web address] OR  [email or web address] OR  https://www.cdc.gov/steadi/patient.html

## 2025-04-02 NOTE — DISCHARGE NOTE NURSING/CASE MANAGEMENT/SOCIAL WORK - PATIENT PORTAL LINK FT
You can access the FollowMyHealth Patient Portal offered by [facility] by registering at the following website: [email or web address]. By joining [facility]’s FollowMyHealth portal, you will also be able to view your health information using other applications (apps) compatible with our system.

## 2025-04-02 NOTE — PROGRESS NOTE ADULT - SUBJECTIVE AND OBJECTIVE BOX
[facility]                                                         CARDIOLOGY AT [address], [place] [address]                                                         Telephone: [phone]. Fax:[phone]                                                          INTERVENTIONAL CARDIOLOGY PROGRESS NOTE    Reason for follow up: Post PCI LAD yesterday on 4/1/25 with Dr. Radford  Update: POD 1 LHC via RRA revealing 75% stenosis of pLAD at D1 s/p 1 DUONG. Patient reports having some palpitations overnight at 1 AM. TELE reviewed and no afib seen. Patient in SR at this time with no complaints. She denies chest pain, chest pressure, shortness of breath, no current palpitations and dizziness. RRA remains stable w/o active bleeding or wrist hematoma. RUE/ right hand remains acyanotic; warm to touch; motor/sensory function intact; 2+ right radial pulse.     Procedure: PCI of pLAD  Procedure Date: 4/1/25  Procedural Interventionalist: Dr. radfodr    Review of symptoms:   Cardiac:  No chest pain. No dyspnea. No palpitations.  Respiratory: no cough. No dyspnea  Gastrointestinal: No diarrhea. No abdominal pain. No bleeding.   Neuro: No focal neuro complaints.    Vitals:  T(C): 36.4 (04-02-25 @ 08:00), Max: 36.8 (04-01-25 @ 11:08)  HR: 80 (04-02-25 @ 08:00) (59 - 80)  BP: 137/82 (04-02-25 @ 08:00) (105/60 - 154/78)  RR: 18 (04-02-25 @ 08:00) (15 - 18)  SpO2: 96% (04-02-25 @ 08:00) (96% - 100%)  Wt(kg): --  I&O's Summary    Weight (kg): 58.967 (04-01 @ 11:08)    PHYSICAL EXAM:  Appearance: Comfortable. No acute distress  HEENT:  Atraumatic. Normocephalic.  Normal oral mucosa  Neurologic: A & O x 3, no gross focal deficits.  Cardiovascular: RRR S1 S2, No murmur, no rubs/gallops. No JVD  Respiratory: Lungs clear to auscultation, unlabored   Gastrointestinal:  Soft, Non-tender, + BS  Lower Extremities: 2+ Peripheral Pulses, No clubbing, cyanosis, or edema  Psychiatry: Patient is calm. No agitation.   Skin: warm and dry.    CURRENT CARDIAC MEDICATIONS:  metoprolol succinate ER 50 milliGRAM(s) Oral daily      CURRENT OTHER MEDICATIONS:  ALPRAZolam 1 milliGRAM(s) Oral daily  atorvastatin 40 milliGRAM(s) Oral at bedtime, Stop order after: 2 Doses  dextrose 50% Injectable 25 Gram(s) IV Push once, Stop order after: 1 Doses  dextrose 50% Injectable 12.5 Gram(s) IV Push once, Stop order after: 1 Doses  dextrose 50% Injectable 25 Gram(s) IV Push once, Stop order after: 1 Doses  dextrose Oral Gel 15 Gram(s) Oral once, Stop order after: 1 Doses PRN Blood Glucose LESS THAN 70 milliGRAM(s)/deciliter  glucagon  Injectable 1 milliGRAM(s) IntraMuscular once, Stop order after: 1 Doses  insulin lispro (ADMELOG) corrective regimen sliding scale   SubCutaneous three times a day before meals  aspirin  chewable 81 milliGRAM(s) Oral daily  clopidogrel Tablet 75 milliGRAM(s) Oral daily  dextrose 5%. 1000 milliLiter(s) (100 mL/Hr) IV Continuous <Continuous>  dextrose 5%. 1000 milliLiter(s) (50 mL/Hr) IV Continuous <Continuous>      LABS:	 	                            13.5   5.42  )-----------( 100      ( 02 Apr 2025 05:14 )             39.7     04-02    139  |  103  |  11.6  ----------------------------<  137[H]  4.3   |  21.0[L]  |  0.46[L]    Ca    8.9      02 Apr 2025 05:14  Mg     2.1     04-02    TPro  6.9  /  Alb  3.9  /  TBili  0.4  /  DBili  x   /  AST  25  /  ALT  35[H]  /  AlkPhos  49  04-02      TELEMETRY: SR with no tele events  ECG: SR with tWI v3 similar to prior. no acute ischemia on ECG    DIAGNOSTIC TESTING:  [ X]  Catheterization: < from: Cardiac Catheterization (04.01.25 @ 12:55) >  Normal Left Main   75% stenosis of proximal LAD at D1; verified by IVUS and stented   Normal LCX   Normal RCA   Recommendations:   Aspirin and Plavix     < end of copied text >

## 2025-04-02 NOTE — DISCHARGE NOTE NURSING/CASE MANAGEMENT/SOCIAL WORK - FINANCIAL ASSISTANCE
[facility] provides services at a reduced cost to those who are determined to be eligible through [facility]’s financial assistance program. Information regarding [facility]’s financial assistance program can be found by going to [email or web address] or by calling [phone].

## 2025-04-07 ENCOUNTER — OFFICE (OUTPATIENT)
Dept: URBAN - METROPOLITAN AREA CLINIC 94 | Facility: CLINIC | Age: 75
Setting detail: OPHTHALMOLOGY
End: 2025-04-07
Payer: MEDICARE

## 2025-04-07 DIAGNOSIS — E11.3313: ICD-10-CM

## 2025-04-07 DIAGNOSIS — H35.3213: ICD-10-CM

## 2025-04-07 DIAGNOSIS — E11.3511: ICD-10-CM

## 2025-04-07 DIAGNOSIS — H34.8310: ICD-10-CM

## 2025-04-07 DIAGNOSIS — H43.812: ICD-10-CM

## 2025-04-07 DIAGNOSIS — H35.3122: ICD-10-CM

## 2025-04-07 DIAGNOSIS — E11.3312: ICD-10-CM

## 2025-04-07 PROCEDURE — 99024 POSTOP FOLLOW-UP VISIT: CPT | Performed by: SPECIALIST

## 2025-04-07 PROCEDURE — 92134 CPTRZ OPH DX IMG PST SGM RTA: CPT | Performed by: SPECIALIST

## 2025-04-07 ASSESSMENT — KERATOMETRY
METHOD_AUTO_MANUAL: AUTO
OD_K2POWER_DIOPTERS: 43.75
OD_K1POWER_DIOPTERS: 42.75
OS_K2POWER_DIOPTERS: 44.25
OS_AXISANGLE_DEGREES: 019
OD_AXISANGLE_DEGREES: 060
OS_K1POWER_DIOPTERS: 43.75

## 2025-04-07 ASSESSMENT — TONOMETRY
OD_IOP_MMHG: 14
OS_IOP_MMHG: 16

## 2025-04-07 ASSESSMENT — REFRACTION_AUTOREFRACTION
OD_SPHERE: -0.50
OS_SPHERE: +0.25
OS_CYLINDER: -1.50
OS_AXIS: 094
OD_AXIS: 098
OD_CYLINDER: -1.50

## 2025-04-07 ASSESSMENT — VISUAL ACUITY
OD_BCVA: 20/20-1
OS_BCVA: 20/50

## 2025-04-07 ASSESSMENT — CONFRONTATIONAL VISUAL FIELD TEST (CVF)
OS_FINDINGS: FULL
OD_FINDINGS: FULL

## 2025-04-07 ASSESSMENT — PACHYMETRY
OS_CT_CORRECTION: 2
OD_CT_UM: 515
OS_CT_UM: 519
OD_CT_CORRECTION: 2

## 2025-04-15 ENCOUNTER — OFFICE (OUTPATIENT)
Dept: URBAN - METROPOLITAN AREA CLINIC 94 | Facility: CLINIC | Age: 75
Setting detail: OPHTHALMOLOGY
End: 2025-04-15
Payer: MEDICARE

## 2025-04-15 DIAGNOSIS — T81.31XA: ICD-10-CM

## 2025-04-15 PROCEDURE — 99024 POSTOP FOLLOW-UP VISIT: CPT | Performed by: OPHTHALMOLOGY

## 2025-04-15 ASSESSMENT — REFRACTION_AUTOREFRACTION
OD_SPHERE: -0.50
OS_SPHERE: +0.25
OD_CYLINDER: -1.50
OS_CYLINDER: -1.50
OD_AXIS: 098
OS_AXIS: 094

## 2025-04-15 ASSESSMENT — PACHYMETRY
OS_CT_CORRECTION: 2
OD_CT_CORRECTION: 2
OS_CT_UM: 519
OD_CT_UM: 515

## 2025-04-15 ASSESSMENT — CONFRONTATIONAL VISUAL FIELD TEST (CVF)
OS_FINDINGS: FULL
OD_FINDINGS: FULL

## 2025-04-15 ASSESSMENT — KERATOMETRY
OS_K2POWER_DIOPTERS: 44.25
OS_AXISANGLE_DEGREES: 019
OD_K2POWER_DIOPTERS: 43.75
OD_AXISANGLE_DEGREES: 060
OD_K1POWER_DIOPTERS: 42.75
OS_K1POWER_DIOPTERS: 43.75
METHOD_AUTO_MANUAL: AUTO

## 2025-04-15 ASSESSMENT — TONOMETRY: OS_IOP_MMHG: 17

## 2025-04-15 ASSESSMENT — VISUAL ACUITY
OD_BCVA: 20/25
OS_BCVA: 20/50

## 2025-04-18 ASSESSMENT — KERATOMETRY
OD_K1POWER_DIOPTERS: 44.00
OD_AXISANGLE_DEGREES: 065
METHOD_AUTO_MANUAL: AUTO
OS_K1POWER_DIOPTERS: 42.75
OD_K2POWER_DIOPTERS: 44.50
OS_K2POWER_DIOPTERS: 44.25
OS_AXISANGLE_DEGREES: 005

## 2025-05-20 ASSESSMENT — KERATOMETRY
OS_K1POWER_DIOPTERS: 42.75
METHOD_AUTO_MANUAL: AUTO
OD_K2POWER_DIOPTERS: 44.50
OD_K1POWER_DIOPTERS: 44.00
OS_K2POWER_DIOPTERS: 44.25
OD_AXISANGLE_DEGREES: 065
OS_AXISANGLE_DEGREES: 005

## 2025-05-21 ENCOUNTER — APPOINTMENT (OUTPATIENT)
Dept: PULMONOLOGY | Facility: CLINIC | Age: 75
End: 2025-05-21
Payer: MEDICARE

## 2025-05-21 VITALS
HEIGHT: 65 IN | RESPIRATION RATE: 16 BRPM | SYSTOLIC BLOOD PRESSURE: 110 MMHG | BODY MASS INDEX: 21.33 KG/M2 | WEIGHT: 128 LBS | HEART RATE: 73 BPM | OXYGEN SATURATION: 97 % | DIASTOLIC BLOOD PRESSURE: 70 MMHG

## 2025-05-21 DIAGNOSIS — G47.33 OBSTRUCTIVE SLEEP APNEA (ADULT) (PEDIATRIC): ICD-10-CM

## 2025-05-21 DIAGNOSIS — I25.10 ATHEROSCLEROTIC HEART DISEASE OF NATIVE CORONARY ARTERY W/OUT ANGINA PECTORIS: ICD-10-CM

## 2025-05-21 PROCEDURE — G2211 COMPLEX E/M VISIT ADD ON: CPT

## 2025-05-21 PROCEDURE — 99214 OFFICE O/P EST MOD 30 MIN: CPT

## 2025-05-29 NOTE — ASU PREOPERATIVE ASSESSMENT, ADULT ([facility] ONLY) - FALL HARM RISK TYPE OF ASSESSMENT
Recheck 6 months for blood pressure/ sleep.     Labs due 6 months.     Please call the office if you are experiencing any worsening of symptoms or no symptom improvement.     
Admission

## 2025-06-02 ENCOUNTER — OFFICE (OUTPATIENT)
Dept: URBAN - METROPOLITAN AREA CLINIC 94 | Facility: CLINIC | Age: 75
Setting detail: OPHTHALMOLOGY
End: 2025-06-02
Payer: MEDICARE

## 2025-06-02 DIAGNOSIS — H35.3213: ICD-10-CM

## 2025-06-02 DIAGNOSIS — H35.3122: ICD-10-CM

## 2025-06-02 DIAGNOSIS — E11.3313: ICD-10-CM

## 2025-06-02 DIAGNOSIS — H34.8310: ICD-10-CM

## 2025-06-02 DIAGNOSIS — H43.812: ICD-10-CM

## 2025-06-02 PROCEDURE — 92134 CPTRZ OPH DX IMG PST SGM RTA: CPT | Performed by: OPHTHALMOLOGY

## 2025-06-02 PROCEDURE — 92235 FLUORESCEIN ANGRPH MLTIFRAME: CPT | Performed by: OPHTHALMOLOGY

## 2025-06-02 PROCEDURE — 92012 INTRM OPH EXAM EST PATIENT: CPT | Performed by: OPHTHALMOLOGY

## 2025-06-02 ASSESSMENT — KERATOMETRY
OS_K1POWER_DIOPTERS: 43.75
METHOD_AUTO_MANUAL: AUTO
OS_K2POWER_DIOPTERS: 44.25
OD_K2POWER_DIOPTERS: 43.75
OD_K1POWER_DIOPTERS: 42.75
OD_AXISANGLE_DEGREES: 060
OS_AXISANGLE_DEGREES: 019

## 2025-06-02 ASSESSMENT — PACHYMETRY
OS_CT_CORRECTION: 2
OD_CT_CORRECTION: 2
OD_CT_UM: 515
OS_CT_UM: 519

## 2025-06-02 ASSESSMENT — CONFRONTATIONAL VISUAL FIELD TEST (CVF)
OS_FINDINGS: FULL
OD_FINDINGS: FULL

## 2025-06-02 ASSESSMENT — REFRACTION_AUTOREFRACTION
OS_CYLINDER: -1.50
OS_AXIS: 094
OD_SPHERE: -0.50
OS_SPHERE: +0.25
OD_CYLINDER: -1.50
OD_AXIS: 098

## 2025-06-02 ASSESSMENT — TONOMETRY
OS_IOP_MMHG: 13
OD_IOP_MMHG: 14

## 2025-06-02 ASSESSMENT — VISUAL ACUITY
OS_BCVA: 20/40-1
OD_BCVA: 20/20

## 2025-06-13 ENCOUNTER — OFFICE (OUTPATIENT)
Dept: URBAN - METROPOLITAN AREA CLINIC 94 | Facility: CLINIC | Age: 75
Setting detail: OPHTHALMOLOGY
End: 2025-06-13
Payer: MEDICARE

## 2025-06-13 DIAGNOSIS — H04.123: ICD-10-CM

## 2025-06-13 PROCEDURE — 92012 INTRM OPH EXAM EST PATIENT: CPT | Performed by: OPHTHALMOLOGY

## 2025-06-13 ASSESSMENT — KERATOMETRY
OD_AXISANGLE_DEGREES: 060
METHOD_AUTO_MANUAL: AUTO
OD_K1POWER_DIOPTERS: 42.75
OS_K2POWER_DIOPTERS: 44.25
OS_AXISANGLE_DEGREES: 019
OD_K2POWER_DIOPTERS: 43.75
OS_K1POWER_DIOPTERS: 43.75

## 2025-06-13 ASSESSMENT — CONFRONTATIONAL VISUAL FIELD TEST (CVF)
OS_FINDINGS: FULL
OD_FINDINGS: FULL

## 2025-06-13 ASSESSMENT — TONOMETRY
OD_IOP_MMHG: 16
OS_IOP_MMHG: 20

## 2025-06-13 ASSESSMENT — PACHYMETRY
OS_CT_CORRECTION: 2
OD_CT_CORRECTION: 2
OD_CT_UM: 515
OS_CT_UM: 519

## 2025-06-13 ASSESSMENT — REFRACTION_AUTOREFRACTION
OS_AXIS: 094
OD_SPHERE: -0.50
OS_CYLINDER: -1.50
OS_SPHERE: +0.25
OD_AXIS: 098
OD_CYLINDER: -1.50

## 2025-06-13 ASSESSMENT — VISUAL ACUITY
OD_BCVA: 20/20-1
OS_BCVA: 20/60+1

## 2025-06-17 ENCOUNTER — OFFICE (OUTPATIENT)
Dept: URBAN - METROPOLITAN AREA CLINIC 94 | Facility: CLINIC | Age: 75
Setting detail: OPHTHALMOLOGY
End: 2025-06-17
Payer: MEDICARE

## 2025-06-17 DIAGNOSIS — E11.3313: ICD-10-CM

## 2025-06-17 DIAGNOSIS — E11.3511: ICD-10-CM

## 2025-06-17 PROCEDURE — 67210 TREATMENT OF RETINAL LESION: CPT | Mod: RT | Performed by: OPHTHALMOLOGY

## 2025-06-17 PROCEDURE — 92134 CPTRZ OPH DX IMG PST SGM RTA: CPT | Performed by: OPHTHALMOLOGY

## 2025-06-17 ASSESSMENT — REFRACTION_AUTOREFRACTION
OS_AXIS: 094
OD_AXIS: 098
OD_CYLINDER: -1.50
OS_SPHERE: +0.25
OS_CYLINDER: -1.50
OD_SPHERE: -0.50

## 2025-06-17 ASSESSMENT — VISUAL ACUITY
OD_BCVA: 20/20
OS_BCVA: 20/40

## 2025-06-17 ASSESSMENT — KERATOMETRY
METHOD_AUTO_MANUAL: AUTO
OD_K1POWER_DIOPTERS: 42.75
OD_AXISANGLE_DEGREES: 060
OS_K1POWER_DIOPTERS: 43.75
OD_K2POWER_DIOPTERS: 43.75
OS_AXISANGLE_DEGREES: 019
OS_K2POWER_DIOPTERS: 44.25

## 2025-06-17 ASSESSMENT — PACHYMETRY
OD_CT_UM: 515
OS_CT_UM: 519
OD_CT_CORRECTION: 2
OS_CT_CORRECTION: 2

## 2025-06-17 ASSESSMENT — CONFRONTATIONAL VISUAL FIELD TEST (CVF)
OD_FINDINGS: FULL
OS_FINDINGS: FULL

## 2025-06-17 ASSESSMENT — TONOMETRY
OS_IOP_MMHG: 17
OD_IOP_MMHG: 14

## 2025-06-30 ENCOUNTER — OFFICE (OUTPATIENT)
Dept: URBAN - METROPOLITAN AREA CLINIC 94 | Facility: CLINIC | Age: 75
Setting detail: OPHTHALMOLOGY
End: 2025-06-30
Payer: MEDICARE

## 2025-06-30 DIAGNOSIS — E11.3312: ICD-10-CM

## 2025-06-30 DIAGNOSIS — E11.3313: ICD-10-CM

## 2025-06-30 PROBLEM — T81.31XD WOUND DEHISCENCE EXTERNAL; SUBSEQUENT ENCOUNTER: Status: ACTIVE | Noted: 2025-06-13

## 2025-06-30 PROCEDURE — 67210 TREATMENT OF RETINAL LESION: CPT | Mod: 79,LT | Performed by: SPECIALIST

## 2025-06-30 PROCEDURE — 92134 CPTRZ OPH DX IMG PST SGM RTA: CPT | Performed by: SPECIALIST

## 2025-06-30 ASSESSMENT — CONFRONTATIONAL VISUAL FIELD TEST (CVF)
OS_FINDINGS: FULL
OD_FINDINGS: FULL

## 2025-06-30 ASSESSMENT — PACHYMETRY
OD_CT_UM: 515
OD_CT_CORRECTION: 2
OS_CT_UM: 519
OS_CT_CORRECTION: 2

## 2025-06-30 ASSESSMENT — VISUAL ACUITY
OD_BCVA: 20/20
OS_BCVA: 20/60-1

## 2025-06-30 ASSESSMENT — TONOMETRY
OS_IOP_MMHG: 16
OD_IOP_MMHG: 15

## 2025-08-11 ENCOUNTER — OFFICE (OUTPATIENT)
Dept: URBAN - METROPOLITAN AREA CLINIC 94 | Facility: CLINIC | Age: 75
Setting detail: OPHTHALMOLOGY
End: 2025-08-11
Payer: MEDICARE

## 2025-08-11 DIAGNOSIS — H43.812: ICD-10-CM

## 2025-08-11 DIAGNOSIS — E11.3511: ICD-10-CM

## 2025-08-11 DIAGNOSIS — H34.8310: ICD-10-CM

## 2025-08-11 DIAGNOSIS — H35.3213: ICD-10-CM

## 2025-08-11 DIAGNOSIS — E11.3313: ICD-10-CM

## 2025-08-11 DIAGNOSIS — H35.3122: ICD-10-CM

## 2025-08-11 PROCEDURE — 99024 POSTOP FOLLOW-UP VISIT: CPT | Performed by: SPECIALIST

## 2025-08-11 PROCEDURE — 92134 CPTRZ OPH DX IMG PST SGM RTA: CPT | Performed by: SPECIALIST

## 2025-08-11 PROCEDURE — 67028 INJECTION EYE DRUG: CPT | Mod: 58,RT | Performed by: SPECIALIST

## 2025-08-11 ASSESSMENT — TONOMETRY
OD_IOP_MMHG: 15
OS_IOP_MMHG: 13

## 2025-08-11 ASSESSMENT — REFRACTION_AUTOREFRACTION
OS_CYLINDER: -1.50
OS_SPHERE: +0.25
OD_CYLINDER: -1.50
OD_SPHERE: -0.50
OD_AXIS: 098
OS_AXIS: 094

## 2025-08-11 ASSESSMENT — KERATOMETRY
OD_K1POWER_DIOPTERS: 42.75
OS_AXISANGLE_DEGREES: 019
OS_K1POWER_DIOPTERS: 43.75
OS_K2POWER_DIOPTERS: 44.25
OD_K2POWER_DIOPTERS: 43.75
METHOD_AUTO_MANUAL: AUTO
OD_AXISANGLE_DEGREES: 060

## 2025-08-11 ASSESSMENT — PACHYMETRY
OD_CT_UM: 515
OS_CT_CORRECTION: 2
OD_CT_CORRECTION: 2
OS_CT_UM: 519

## 2025-08-11 ASSESSMENT — CONFRONTATIONAL VISUAL FIELD TEST (CVF)
OS_FINDINGS: FULL
OD_FINDINGS: FULL

## 2025-08-11 ASSESSMENT — VISUAL ACUITY
OD_BCVA: 20/25-2
OS_BCVA: 20/80-2